# Patient Record
Sex: MALE | Race: WHITE | HISPANIC OR LATINO | URBAN - METROPOLITAN AREA
[De-identification: names, ages, dates, MRNs, and addresses within clinical notes are randomized per-mention and may not be internally consistent; named-entity substitution may affect disease eponyms.]

---
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
## 2025-05-13 ENCOUNTER — APPOINTMENT (OUTPATIENT)
Dept: RADIOLOGY | Facility: MEDICAL CENTER | Age: 20
DRG: 145 | End: 2025-05-13
Attending: STUDENT IN AN ORGANIZED HEALTH CARE EDUCATION/TRAINING PROGRAM

## 2025-05-13 ENCOUNTER — HOSPITAL ENCOUNTER (INPATIENT)
Facility: MEDICAL CENTER | Age: 20
LOS: 3 days | DRG: 145 | End: 2025-05-16
Attending: STUDENT IN AN ORGANIZED HEALTH CARE EDUCATION/TRAINING PROGRAM

## 2025-05-13 DIAGNOSIS — H49.01 RIGHT OCULOMOTOR NERVE PALSY: ICD-10-CM

## 2025-05-13 DIAGNOSIS — J34.1 MUCOCELE OF ETHMOID SINUS: Primary | ICD-10-CM

## 2025-05-13 DIAGNOSIS — H53.2 BINOCULAR VISION DISORDER WITH DIPLOPIA: ICD-10-CM

## 2025-05-13 LAB
ALBUMIN SERPL BCP-MCNC: 4.3 G/DL (ref 3.2–4.9)
ALBUMIN/GLOB SERPL: 1.3 G/DL
ALP SERPL-CCNC: 83 U/L (ref 30–99)
ALT SERPL-CCNC: 61 U/L (ref 2–50)
ANION GAP SERPL CALC-SCNC: 12 MMOL/L (ref 7–16)
APTT PPP: 27.9 SEC (ref 24.7–36)
AST SERPL-CCNC: 45 U/L (ref 12–45)
BASOPHILS # BLD AUTO: 0.7 % (ref 0–1.8)
BASOPHILS # BLD: 0.07 K/UL (ref 0–0.12)
BILIRUB SERPL-MCNC: 0.4 MG/DL (ref 0.1–1.5)
BUN SERPL-MCNC: 15 MG/DL (ref 8–22)
CALCIUM ALBUM COR SERPL-MCNC: 9 MG/DL (ref 8.5–10.5)
CALCIUM SERPL-MCNC: 9.2 MG/DL (ref 8.5–10.5)
CHLORIDE SERPL-SCNC: 105 MMOL/L (ref 96–112)
CO2 SERPL-SCNC: 23 MMOL/L (ref 20–33)
CREAT SERPL-MCNC: 0.78 MG/DL (ref 0.5–1.4)
EOSINOPHIL # BLD AUTO: 0.3 K/UL (ref 0–0.51)
EOSINOPHIL NFR BLD: 3 % (ref 0–6.9)
ERYTHROCYTE [DISTWIDTH] IN BLOOD BY AUTOMATED COUNT: 40.7 FL (ref 35.9–50)
GFR SERPLBLD CREATININE-BSD FMLA CKD-EPI: 130 ML/MIN/1.73 M 2
GLOBULIN SER CALC-MCNC: 3.4 G/DL (ref 1.9–3.5)
GLUCOSE SERPL-MCNC: 119 MG/DL (ref 65–99)
HCT VFR BLD AUTO: 47.5 % (ref 42–52)
HGB BLD-MCNC: 16 G/DL (ref 14–18)
IMM GRANULOCYTES # BLD AUTO: 0.06 K/UL (ref 0–0.11)
IMM GRANULOCYTES NFR BLD AUTO: 0.6 % (ref 0–0.9)
INR PPP: 0.97 (ref 0.87–1.13)
LYMPHOCYTES # BLD AUTO: 2.48 K/UL (ref 1–4.8)
LYMPHOCYTES NFR BLD: 24.8 % (ref 22–41)
MCH RBC QN AUTO: 27.4 PG (ref 27–33)
MCHC RBC AUTO-ENTMCNC: 33.7 G/DL (ref 32.3–36.5)
MCV RBC AUTO: 81.2 FL (ref 81.4–97.8)
MONOCYTES # BLD AUTO: 0.62 K/UL (ref 0–0.85)
MONOCYTES NFR BLD AUTO: 6.2 % (ref 0–13.4)
NEUTROPHILS # BLD AUTO: 6.49 K/UL (ref 1.82–7.42)
NEUTROPHILS NFR BLD: 64.7 % (ref 44–72)
NRBC # BLD AUTO: 0 K/UL
NRBC BLD-RTO: 0 /100 WBC (ref 0–0.2)
PLATELET # BLD AUTO: 274 K/UL (ref 164–446)
PMV BLD AUTO: 10.7 FL (ref 9–12.9)
POTASSIUM SERPL-SCNC: 3.7 MMOL/L (ref 3.6–5.5)
PROT SERPL-MCNC: 7.7 G/DL (ref 6–8.2)
PROTHROMBIN TIME: 12.9 SEC (ref 12–14.6)
RBC # BLD AUTO: 5.85 M/UL (ref 4.7–6.1)
SODIUM SERPL-SCNC: 140 MMOL/L (ref 135–145)
TSH SERPL DL<=0.005 MIU/L-ACNC: 3.11 UIU/ML (ref 0.38–5.33)
WBC # BLD AUTO: 10 K/UL (ref 4.8–10.8)

## 2025-05-13 PROCEDURE — 36415 COLL VENOUS BLD VENIPUNCTURE: CPT

## 2025-05-13 PROCEDURE — 99223 1ST HOSP IP/OBS HIGH 75: CPT | Mod: GC

## 2025-05-13 PROCEDURE — 70498 CT ANGIOGRAPHY NECK: CPT

## 2025-05-13 PROCEDURE — 770006 HCHG ROOM/CARE - MED/SURG/GYN SEMI*

## 2025-05-13 PROCEDURE — 99285 EMERGENCY DEPT VISIT HI MDM: CPT

## 2025-05-13 PROCEDURE — 70496 CT ANGIOGRAPHY HEAD: CPT

## 2025-05-13 PROCEDURE — 84443 ASSAY THYROID STIM HORMONE: CPT

## 2025-05-13 PROCEDURE — 80053 COMPREHEN METABOLIC PANEL: CPT

## 2025-05-13 PROCEDURE — 85025 COMPLETE CBC W/AUTO DIFF WBC: CPT

## 2025-05-13 PROCEDURE — 85610 PROTHROMBIN TIME: CPT

## 2025-05-13 PROCEDURE — 700117 HCHG RX CONTRAST REV CODE 255: Performed by: STUDENT IN AN ORGANIZED HEALTH CARE EDUCATION/TRAINING PROGRAM

## 2025-05-13 PROCEDURE — 93005 ELECTROCARDIOGRAM TRACING: CPT | Mod: TC

## 2025-05-13 PROCEDURE — 85730 THROMBOPLASTIN TIME PARTIAL: CPT

## 2025-05-13 RX ORDER — AMOXICILLIN 250 MG
2 CAPSULE ORAL EVERY EVENING
Status: DISCONTINUED | OUTPATIENT
Start: 2025-05-13 | End: 2025-05-16 | Stop reason: HOSPADM

## 2025-05-13 RX ORDER — POLYETHYLENE GLYCOL 3350 17 G/17G
1 POWDER, FOR SOLUTION ORAL
Status: DISCONTINUED | OUTPATIENT
Start: 2025-05-13 | End: 2025-05-16 | Stop reason: HOSPADM

## 2025-05-13 RX ORDER — ACETAMINOPHEN 325 MG/1
650 TABLET ORAL EVERY 6 HOURS PRN
Status: DISCONTINUED | OUTPATIENT
Start: 2025-05-13 | End: 2025-05-16 | Stop reason: HOSPADM

## 2025-05-13 RX ADMIN — IOHEXOL 70 ML: 350 INJECTION, SOLUTION INTRAVENOUS at 21:30

## 2025-05-13 ASSESSMENT — ENCOUNTER SYMPTOMS
BLURRED VISION: 0
HEADACHES: 1
ABDOMINAL PAIN: 0
FEVER: 0
DOUBLE VISION: 1
EYE PAIN: 1
SHORTNESS OF BREATH: 0

## 2025-05-14 ENCOUNTER — APPOINTMENT (OUTPATIENT)
Dept: RADIOLOGY | Facility: MEDICAL CENTER | Age: 20
DRG: 145 | End: 2025-05-14
Attending: OTOLARYNGOLOGY

## 2025-05-14 ENCOUNTER — APPOINTMENT (OUTPATIENT)
Dept: RADIOLOGY | Facility: MEDICAL CENTER | Age: 20
DRG: 145 | End: 2025-05-14
Attending: STUDENT IN AN ORGANIZED HEALTH CARE EDUCATION/TRAINING PROGRAM

## 2025-05-14 PROBLEM — J34.1 MUCOCELE OF ETHMOID SINUS: Status: ACTIVE | Noted: 2025-05-14

## 2025-05-14 PROCEDURE — 70543 MRI ORBT/FAC/NCK W/O &W/DYE: CPT

## 2025-05-14 PROCEDURE — 70553 MRI BRAIN STEM W/O & W/DYE: CPT

## 2025-05-14 PROCEDURE — A9579 GAD-BASE MR CONTRAST NOS,1ML: HCPCS | Mod: JZ | Performed by: STUDENT IN AN ORGANIZED HEALTH CARE EDUCATION/TRAINING PROGRAM

## 2025-05-14 PROCEDURE — 99233 SBSQ HOSP IP/OBS HIGH 50: CPT | Performed by: STUDENT IN AN ORGANIZED HEALTH CARE EDUCATION/TRAINING PROGRAM

## 2025-05-14 PROCEDURE — 770006 HCHG ROOM/CARE - MED/SURG/GYN SEMI*

## 2025-05-14 PROCEDURE — 700117 HCHG RX CONTRAST REV CODE 255: Mod: JZ | Performed by: STUDENT IN AN ORGANIZED HEALTH CARE EDUCATION/TRAINING PROGRAM

## 2025-05-14 RX ADMIN — GADOTERIDOL 20 ML: 279.3 INJECTION, SOLUTION INTRAVENOUS at 08:31

## 2025-05-14 SDOH — ECONOMIC STABILITY: TRANSPORTATION INSECURITY
IN THE PAST 12 MONTHS, HAS THE LACK OF TRANSPORTATION KEPT YOU FROM MEDICAL APPOINTMENTS OR FROM GETTING MEDICATIONS?: NO

## 2025-05-14 SDOH — ECONOMIC STABILITY: TRANSPORTATION INSECURITY
IN THE PAST 12 MONTHS, HAS LACK OF RELIABLE TRANSPORTATION KEPT YOU FROM MEDICAL APPOINTMENTS, MEETINGS, WORK OR FROM GETTING THINGS NEEDED FOR DAILY LIVING?: NO

## 2025-05-14 ASSESSMENT — ENCOUNTER SYMPTOMS
VOMITING: 0
NAUSEA: 0
SHORTNESS OF BREATH: 0
FEVER: 0
ABDOMINAL PAIN: 0
BLURRED VISION: 1

## 2025-05-14 ASSESSMENT — COGNITIVE AND FUNCTIONAL STATUS - GENERAL
DAILY ACTIVITIY SCORE: 24
SUGGESTED CMS G CODE MODIFIER MOBILITY: CH
MOBILITY SCORE: 24
SUGGESTED CMS G CODE MODIFIER DAILY ACTIVITY: CH

## 2025-05-14 ASSESSMENT — SOCIAL DETERMINANTS OF HEALTH (SDOH)
WITHIN THE LAST YEAR, HAVE TO BEEN RAPED OR FORCED TO HAVE ANY KIND OF SEXUAL ACTIVITY BY YOUR PARTNER OR EX-PARTNER?: NO
WITHIN THE PAST 12 MONTHS, YOU WORRIED THAT YOUR FOOD WOULD RUN OUT BEFORE YOU GOT THE MONEY TO BUY MORE: NEVER TRUE
WITHIN THE LAST YEAR, HAVE YOU BEEN HUMILIATED OR EMOTIONALLY ABUSED IN OTHER WAYS BY YOUR PARTNER OR EX-PARTNER?: NO
WITHIN THE PAST 12 MONTHS, THE FOOD YOU BOUGHT JUST DIDN'T LAST AND YOU DIDN'T HAVE MONEY TO GET MORE: NEVER TRUE
WITHIN THE LAST YEAR, HAVE YOU BEEN AFRAID OF YOUR PARTNER OR EX-PARTNER?: NO
WITHIN THE LAST YEAR, HAVE YOU BEEN KICKED, HIT, SLAPPED, OR OTHERWISE PHYSICALLY HURT BY YOUR PARTNER OR EX-PARTNER?: NO
IN THE PAST 12 MONTHS, HAS THE ELECTRIC, GAS, OIL, OR WATER COMPANY THREATENED TO SHUT OFF SERVICE IN YOUR HOME?: NO
WITHIN THE LAST YEAR, HAVE YOU BEEN AFRAID OF YOUR PARTNER OR EX-PARTNER?: NO

## 2025-05-14 ASSESSMENT — PAIN DESCRIPTION - PAIN TYPE
TYPE: ACUTE PAIN

## 2025-05-14 ASSESSMENT — PATIENT HEALTH QUESTIONNAIRE - PHQ9
2. FEELING DOWN, DEPRESSED, IRRITABLE, OR HOPELESS: NOT AT ALL
2. FEELING DOWN, DEPRESSED, IRRITABLE, OR HOPELESS: NOT AT ALL
1. LITTLE INTEREST OR PLEASURE IN DOING THINGS: NOT AT ALL
1. LITTLE INTEREST OR PLEASURE IN DOING THINGS: NOT AT ALL
SUM OF ALL RESPONSES TO PHQ9 QUESTIONS 1 AND 2: 0
SUM OF ALL RESPONSES TO PHQ9 QUESTIONS 1 AND 2: 0

## 2025-05-14 NOTE — H&P
[facility] Internal Medicine History & Physical Note    Date of Service  5/13/2025    [facility] Team: DAYANA   Attending: Bradly Devine D.o.  Senior Resident: Dr. Joyner  Contact Number: [phone]    Primary Care Physician  RADHA Tong    Code Status  Full Code    Chief Complaint  Chief Complaint   Patient presents with    Blurred Vision     Pt states for a few days he's been having issues seeing out of his right eye, feels like it is cross eyed at times. Denies any trauma. 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        History of Presenting Illness (HPI):   Gurjit Burgess is a 19 y.o. male with no significant past medical history who presented 5/13/2025 with double vision.  Patient states that all of a sudden a couple of days ago he noticed that it was hard for him to focus.  He felt like there was maybe dirt or an eyelash in his right eye.  When he went to go look at a mirror, he noticed that it seemed that his right eye was not aligned and more droopy than the left.  He reports associated eye pain and pain along the right side of his nose near his eye that comes and goes.  He denies any blurry vision.  He denies trauma to the area.    Vitals are stable.  CBC and CMP fairly unremarkable.  CTA head/neck without any acute abnormalities.  Stroke neurology was consulted in the ED who recommended further evaluation with MRI and formal neurology consult in the morning.     I discussed the plan of care with patient and family.    Review of Systems  Review of Systems   Constitutional:  Negative for fever.   Eyes:  Positive for double vision and pain. Negative for blurred vision.   Respiratory:  Negative for shortness of breath.    Cardiovascular:  Negative for chest pain.   Gastrointestinal:  Negative for abdominal pain.   Neurological:  Positive for headaches.       Past Medical History   has a past medical history of Asthma, Delayed emergence from general anesthesia, and Pain.    Surgical History   has a past  surgical history that includes other; orif, elbow (Right, 2008); pr knee scope,diagnostic (Right, 8/13/2020); and ligament repair (Right, 8/13/2020).     Family History  family history is not on file.   Family history reviewed with patient.     Social History  Denies tobacco, alcohol, recreational drug use    Allergies  Allergies   Allergen Reactions    Amoxicillin      Generalized swelling       Medications  Prior to Admission Medications   Prescriptions Last Dose Informant Patient Reported? Taking?   NS SOLN 60 mL with albuterol 2.5 mg/0.5 mL NEBU 100 mg   Yes No   Sig: by Nebulization route as needed. Very rarely   acetaminophen (TYLENOL) 325 MG Tab   Yes No   Sig: Take 650 mg by mouth every four hours as needed.   cyclobenzaprine (FLEXERIL) 5 MG tablet   No No   Sig: Take 1-2 Tabs by mouth 3 times a day as needed for Moderate Pain or Muscle Spasms.      Facility-Administered Medications: None       Physical Exam  Temp:  [36 °C (96.8 °F)] 36 °C (96.8 °F)  Pulse:  [88-90] 88  Resp:  [16] 16  BP: (120-160)/(67-96) 133/67  SpO2:  [97 %-98 %] 98 %  Blood Pressure: 133/67   Temperature: 36 °C (96.8 °F)   Pulse: 88   Respiration: 16   Pulse Oximetry: 98 %       Physical Exam  Constitutional:       General: He is not in acute distress.  HENT:      Head: Normocephalic and atraumatic.   Eyes:      General:         Right eye: No discharge.         Left eye: No discharge.      Extraocular Movements: Extraocular movements intact.      Conjunctiva/sclera: Conjunctivae normal.      Pupils: Pupils are equal, round, and reactive to light.   Cardiovascular:      Heart sounds: Normal heart sounds.   Pulmonary:      Breath sounds: Normal breath sounds.   Neurological:      General: No focal deficit present.      Mental Status: He is oriented to person, place, and time.      Cranial Nerves: No cranial nerve deficit.      Sensory: No sensory deficit.      Motor: No weakness.         Laboratory:  Recent Labs     05/13/25 2044   WBC  "10.0   RBC 5.85   HEMOGLOBIN 16.0   HEMATOCRIT 47.5   MCV 81.2*   MCH 27.4   MCHC 33.7   RDW 40.7   PLATELETCT 274   MPV 10.7     Recent Labs     05/13/25 2044   SODIUM 140   POTASSIUM 3.7   CHLORIDE 105   CO2 23   GLUCOSE 119*   BUN 15   CREATININE 0.78   CALCIUM 9.2     Recent Labs     05/13/25 2044   ALTSGPT 61*   ASTSGOT 45   ALKPHOSPHAT 83   TBILIRUBIN 0.4   GLUCOSE 119*     Recent Labs     05/13/25 2044   APTT 27.9   INR 0.97     No results for input(s): \"NTPROBNP\" in the last 72 hours.      No results for input(s): \"TROPONINT\" in the last 72 hours.    Imaging:  CT-CTA NECK WITH & W/O-POST PROCESSING   Final Result      1. No evidence of flow-limiting stenosis in the cervical carotid or cervical vertebral arteries.      CT-CTA HEAD WITH & W/O-POST PROCESS   Final Result         1. No hemodynamically significant narrowing of the major intracranial vessels.      MR-BRAIN-WITH & W/O    (Results Pending)       Assessment/Plan:  Problem Representation:   This is a 19-year-old male with no significant past medical history who is presenting with diplopia of his right eye concerning for a cranial nerve palsy, thus requiring admission for monitoring and further evaluation with MRI and formal neurology consult.  I anticipate this patient will require at least two midnights for appropriate medical management, necessitating inpatient admission because diplopia    Patient will need a Med/Surg bed on NEUROLOGY service .  The need is secondary to diplopia.    * Diplopia- (present on admission)  Assessment & Plan  Onset a couple of days ago. States that he is having a difficult time focusing.  No known trauma to the eye. There are no focal neurological deficits noted on exam.  Pupils are equal and reactive to light and extraocular muscles are intact  CTA head/neck negative for any acute abnormalities  Stroke neurology (Dr. Pearl) was contacted in the ED who recommended MRI brain with and without  -Follow-up on MRI " brain  - Formal neurology consult in the morning  -Every 4 hour neurochecks        VTE prophylaxis: SCDs/TEDs

## 2025-05-14 NOTE — CARE PLAN
The patient is Stable - Low risk of patient condition declining or worsening    Shift Goals  Clinical Goals: Q4 neuro checks, patient safety  Patient Goals: rest  Family Goals: cindy    Patient A & O x 4, utilizes call light appropriately. Admission info obtained during downtime, added after EPIC reset.  Bed in low and locked position, call light in reach, rounded on hourly.       Progress made toward(s) clinical / shift goals:    Problem: Knowledge Deficit - Standard  Goal: Patient and family/care givers will demonstrate understanding of plan of care, disease process/condition, diagnostic tests and medications  5/14/2025 0436 by Alban Abel R.N.  Outcome: Progressing      Patient is not progressing towards the following goals:

## 2025-05-14 NOTE — ED NOTES
Med Rec complete per patient  Allergies reviewed with patient  Patient denies taking any RX or OTC meds in the last 30 days    Marley Aburto CPhT

## 2025-05-14 NOTE — PROGRESS NOTES
4 Eyes Skin Assessment Completed by DIANA Phoenix and DIANA Park.    Head WDL  Ears Redness and Blanching tops of ears where glasses sit.   Nose WDL  Mouth WDL  Neck WDL  Breast/Chest WDL  Shoulder Blades WDL  Spine WDL  (R) Arm/Elbow/Hand Scar at elbow  (L) Arm/Elbow/Hand WDL  Abdomen Scar  Groin WDL  Scrotum/Coccyx/Buttocks WDL  (R) Leg Scar  (L) Leg WDL  (R) Heel/Foot/Toe WDL  (L) Heel/Foot/Toe WDL          Devices In Places PIV      Interventions In Place Pillows    Possible Skin Injury No    Pictures Uploaded Into Epic N/A  Wound Consult Placed N/A  RN Wound Prevention Protocol Ordered No

## 2025-05-14 NOTE — ED NOTES
Transport at bedside for pt transfer. Pt is A&Ox4 on RA. Chart sent with pt transfer, all belongings accounted for

## 2025-05-14 NOTE — HOSPITAL COURSE
Gurjit Burgess is a 19-year-old male with unremarkable PMHx.  Admitted 5/13 for double vision.    Per history:  Patient states that all of a sudden a couple of days ago he noticed that it was hard for him to focus. He felt like there was maybe dirt or an eyelash in his right eye. When he went to go look at a mirror, he noticed that it seemed that his right eye was not aligned and more droopy than the left. He reports associated eye pain and pain along the right side of his nose near his eye that comes and goes. He denies any blurry vision. He denies trauma to the area.     In the ED: Vital stable.  CTA head and neck without acute processes.  Neurology was consulted and recommended MRI and formal neurology consult.

## 2025-05-14 NOTE — ED TRIAGE NOTES
Gurjit Burgess  19 y.o. male    Chief Complaint   Patient presents with    Blurred Vision     Pt states for a few days he's been having issues seeing out of his right eye, feels like it is cross eyed at times. Denies any trauma. 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      Pt ambulated to triage for above complaint. Pt states no pain in eye unless trying to focus hard on something. Pt alert and oriented.     Vitals:    05/13/25 1947   BP: (!) 160/96   Pulse: 90   Resp: 16   Temp: 36 °C (96.8 °F)   SpO2: 97%       Triage process explained to patient, apologized for wait time, and returned to [facility].  Pt informed to notify staff of any change in condition.

## 2025-05-14 NOTE — ED PROVIDER NOTES
ED Provider Note    CHIEF COMPLAINT  Chief Complaint   Patient presents with    Blurred Vision     Pt states for a few days he's been having issues seeing out of his right eye, feels like it is cross eyed at times. Denies any trauma. 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        EXTERNAL RECORDS REVIEWED  Reviewed several internal emergency department notes and operative report by orthopedic surgeon back in 2020 for patellofemoral instability    HPI/ROS  LIMITATION TO HISTORY   Select: : None  OUTSIDE HISTORIAN(S):  Mother at bedside providing collateral history    Gurjit Burgess is a 19 y.o. male with past medical history of asthma presenting to the emergency department for diplopia.  States that over the last 3 days, he has been having double vision, he endorses associated headache.  He denies any change in speech, swallow, unilateral weakness numbness tingling, clumsiness.  No recent trauma.  He does wear glasses.  He says that his visual acuity in his eyes is unchanged when he closes 1 eye.  When he closes 1 eye he does not have any diplopia.      PAST MEDICAL HISTORY   has a past medical history of Asthma, Delayed emergence from general anesthesia, and Pain.    SURGICAL HISTORY   has a past surgical history that includes other; orif, elbow (Right, 2008); knee scope,diagnostic (Right, 8/13/2020); and ligament repair (Right, 8/13/2020).    FAMILY HISTORY  History reviewed. No pertinent family history.    SOCIAL HISTORY  Social History     Tobacco Use    Smoking status: Never    Smokeless tobacco: Never   Vaping Use    Vaping status: Never Used   Substance and Sexual Activity    Alcohol use: No    Drug use: No    Sexual activity: Not on file       CURRENT MEDICATIONS  Home Medications       Reviewed by Maryjo Cummins (Pharmacy Tech) on 05/13/25 at 2301  Med List Status: Complete     Medication Last Dose Status        Patient Cheikh Taking any Medications                         Audit from Redirected  "Encounters    **Home medications have not yet been reviewed for this encounter**         ALLERGIES  Allergies   Allergen Reactions    Amoxicillin      Generalized swelling       PHYSICAL EXAM  VITAL SIGNS: /67   Pulse 68   Temp 36 °C (96.8 °F) (Temporal)   Resp 18   Ht 1.803 m (5' 11\")   Wt (!) 129 kg (283 lb 15.2 oz)   SpO2 96%   BMI 39.60 kg/m²    General: non-toxic, no acute distress  Neuro:   - Alert and oriented  - Patient has strabismus with unequal extraocular motion, is unable to elevate his right eye with extraocular motion testing Otherwise CN 2-12 grossly intact  - Pupils are reactive bilaterally, he does not have anisocoria  - No vertical or rotatory nystagmus.  Mild horizontal nystagmus  - Upper extremities         - Normal strength         - Sensation intact to light touch  - Lower extremities         - Normal strength         - Sensation intact to light touch  - No pronator drift  - Romberg negative  - Normal finger to nose  - No truncal ataxia  HEENT:   - Head: Normocephalic, atraumatic  - Eyes: PERRL  - Ears/Nose: normal external nose and ears  - Mouth: moist mucosal membranes  Resp: clear to auscultation, no increased work of breathing  CV: Regular rate and rhythm  Abd: Soft, non-tender, non-distended  Extremities: No peripheral edema  Psych: lucid and conversational, pleasant cooperative        DIAGNOSTIC STUDIES / PROCEDURES    LABS and ECG  Results for orders placed or performed during the hospital encounter of 05/13/25   CBC WITH DIFFERENTIAL    Collection Time: 05/13/25  8:44 PM   Result Value Ref Range    WBC 10.0 4.8 - 10.8 K/uL    RBC 5.85 4.70 - 6.10 M/uL    Hemoglobin 16.0 14.0 - 18.0 g/dL    Hematocrit 47.5 42.0 - 52.0 %    MCV 81.2 (L) 81.4 - 97.8 fL    MCH 27.4 27.0 - 33.0 pg    MCHC 33.7 32.3 - 36.5 g/dL    RDW 40.7 35.9 - 50.0 fL    Platelet Count 274 164 - 446 K/uL    MPV 10.7 9.0 - 12.9 fL    Neutrophils-Polys 64.70 44.00 - 72.00 %    Lymphocytes 24.80 22.00 - 41.00 % "    Monocytes 6.20 0.00 - 13.40 %    Eosinophils 3.00 0.00 - 6.90 %    Basophils 0.70 0.00 - 1.80 %    Immature Granulocytes 0.60 0.00 - 0.90 %    Nucleated RBC 0.00 0.00 - 0.20 /100 WBC    Neutrophils (Absolute) 6.49 1.82 - 7.42 K/uL    Lymphs (Absolute) 2.48 1.00 - 4.80 K/uL    Monos (Absolute) 0.62 0.00 - 0.85 K/uL    Eos (Absolute) 0.30 0.00 - 0.51 K/uL    Baso (Absolute) 0.07 0.00 - 0.12 K/uL    Immature Granulocytes (abs) 0.06 0.00 - 0.11 K/uL    NRBC (Absolute) 0.00 K/uL   COMP METABOLIC PANEL    Collection Time: 25  8:44 PM   Result Value Ref Range    Sodium 140 135 - 145 mmol/L    Potassium 3.7 3.6 - 5.5 mmol/L    Chloride 105 96 - 112 mmol/L    Co2 23 20 - 33 mmol/L    Anion Gap 12.0 7.0 - 16.0    Glucose 119 (H) 65 - 99 mg/dL    Bun 15 8 - 22 mg/dL    Creatinine 0.78 0.50 - 1.40 mg/dL    Calcium 9.2 8.5 - 10.5 mg/dL    Correct Calcium 9.0 8.5 - 10.5 mg/dL    AST(SGOT) 45 12 - 45 U/L    ALT(SGPT) 61 (H) 2 - 50 U/L    Alkaline Phosphatase 83 30 - 99 U/L    Total Bilirubin 0.4 0.1 - 1.5 mg/dL    Albumin 4.3 3.2 - 4.9 g/dL    Total Protein 7.7 6.0 - 8.2 g/dL    Globulin 3.4 1.9 - 3.5 g/dL    A-G Ratio 1.3 g/dL   Prothrombin Time    Collection Time: 25  8:44 PM   Result Value Ref Range    PT 12.9 12.0 - 14.6 sec    INR 0.97 0.87 - 1.13   APTT    Collection Time: 25  8:44 PM   Result Value Ref Range    APTT 27.9 24.7 - 36.0 sec   ESTIMATED GFR    Collection Time: 25  8:44 PM   Result Value Ref Range    GFR (CKD-EPI) 130 >60 mL/min/1.73 m 2   EKG    Collection Time: 25 11:05 PM   Result Value Ref Range    Report       [facility] Emergency Dept.    Test Date:  2025  Pt Name:    MAKENZIE WISEMAN                Department: ER  MRN:        [id number]                      Room:       [facility]  Gender:     Male                         Technician: [id number]  :        2005                   Requested By:ZAHRAA EUBANKS  Order #:    [id number]                    Reading  MD:    Measurements  Intervals                                Axis  Rate:       65                           P:          34  IN:         152                          QRS:        -48  QRSD:       107                          T:          -15  QT:         393  QTc:        409    Interpretive Statements  Sinus rhythm  LAD, consider left anterior fascicular block  Borderline T abnormalities, inferior leads  Compared to ECG 08/22/2017 22:19:52  T-wave abnormality now present  Sinus bradycardia no longer present  Left-axis deviation no longer present         RADIOLOGY  I have independently interpreted the diagnostic imaging associated with this visit and am waiting the final reading from the radiologist.   My preliminary interpretation is as follows:   - Reviewed CT angiogram head and neck shows no intracranial process  Radiologist interpretation:   CT-CTA NECK WITH & W/O-POST PROCESSING   Final Result      1. No evidence of flow-limiting stenosis in the cervical carotid or cervical vertebral arteries.      CT-CTA HEAD WITH & W/O-POST PROCESS   Final Result         1. No hemodynamically significant narrowing of the major intracranial vessels.      MR-BRAIN-WITH & W/O    (Results Pending)           MEDICAL DECISION MAKING      ED COURSE AND PLAN    This is a very pleasant 19-year-old gentleman who comes to the emergency department for 3 days of binocular diplopia.  His visual acuity is intact.  On extraocular motion testing he has evidence of superior rectus/cranial nerve III palsy.  Concern for aneurysm or intracranial mass so I ordered CT angiogram head and neck as well as MRI brain with and without contrast.   As he has no visual deficits and his visual acuity is at his baseline, I do not feel that pressure testing of the eye, slit-lamp, retinal exam, discussion with ophthalmology is indicated at this time    CT angiogram head and neck shows no intracranial vascular abnormality or intracranial hemorrhage.    I discussed  case with our stroke neurologist on-call Dr. Pearl as our general neurologist is no longer on-call, he recommended an MRI with and without contrast of the brain, given age frame could be a inflammatory lesion, demyelinating lesion or an intracranial mass.  Patient can be seen by neurology in the morning    Discussed case with Dr. Devine for admission pending Mri brain               Procedures:      ----------------------------------------------------------------------------------  DISCUSSIONS    I have discussed management of the patient with the following physicians and KINGA's: Stroke neurology, admitting hospitalist    Discussion of management with other [facility] or appropriate source(s):     Escalation of care considered, and ultimately not performed: Consider ophthalmology consultation     Barriers to care at this time, including but not limited to:     Decision tools and prescription drugs considered including, but not limited to:     FINAL IMPRESSION    1. Right oculomotor nerve palsy    2. Binocular vision disorder with diplopia        New Prescriptions    No medications on file       No follow-up provider specified.      DISPOSITION      Admission: The patient will be admitted for further evaluation and treatment. Discussed case with consultants and relayed all necessary information.      This chart was dictated using an electronic voice recognition software. The chart has been reviewed and edited but there is still possibility for dictation errors due to limitation of software.    Michael Lyle,  5/13/2025

## 2025-05-14 NOTE — PROGRESS NOTES
Diplopia.  [facility] Medicine Daily Progress Note    Date of Service  5/14/2025    Chief Complaint  Gurjit Burgess is a 19 y.o. male admitted 5/13/2025 with double vision    Hospital Course  Gurjit Burgess is a 19-year-old male with unremarkable PMHx.  Admitted 5/13 for double vision.    Per history:  Patient states that all of a sudden a couple of days ago he noticed that it was hard for him to focus. He felt like there was maybe dirt or an eyelash in his right eye. When he went to go look at a mirror, he noticed that it seemed that his right eye was not aligned and more droopy than the left. He reports associated eye pain and pain along the right side of his nose near his eye that comes and goes. He denies any blurry vision. He denies trauma to the area.     In the ED: Vital stable.  CTA head and neck without acute processes.  Neurology was consulted and recommended MRI and formal neurology consult.    Interval Problem Update  5/14: Vitals overnight stable.   through 133.  MRI brain and orbits completed today.  Showing a 48 x 32 x 42 mm frontal ethmoidal mucocele.  Discussed with neurosurgery, Dr. Will.  Recommending co-management with ENT.  Discussed with Dr. Harding.     I have discussed this patient's plan of care and discharge plan at IDT rounds today with Case Management, Nursing, Nursing leadership, and other members of the IDT team.    Consultants/Specialty  ENT and neurosurgery    Code Status  Full Code    Disposition  The patient is not medically cleared for discharge to home or a post-acute facility.      I have placed the appropriate orders for post-discharge needs.    Review of Systems  Review of Systems   Constitutional:  Negative for fever and malaise/fatigue.   Eyes:  Positive for blurred vision.   Respiratory:  Negative for shortness of breath.    Cardiovascular:  Negative for chest pain and leg swelling.   Gastrointestinal:  Negative for abdominal pain, nausea and vomiting.         Physical Exam  Temp:  [36 °C (96.8 °F)-36.8 °C (98.2 °F)] 36.8 °C (98.2 °F)  Pulse:  [55-90] 55  Resp:  [16-20] 18  BP: (115-160)/(61-96) 125/61  SpO2:  [94 %-98 %] 95 %    Physical Exam  Vitals and nursing note reviewed.   Constitutional:       General: He is not in acute distress.     Appearance: Normal appearance. He is not ill-appearing.   Eyes:      General:         Right eye: No discharge.         Left eye: No discharge.      Extraocular Movements: Extraocular movements intact.      Conjunctiva/sclera: Conjunctivae normal.      Pupils: Pupils are equal, round, and reactive to light.      Comments: Mild right proptosis is present   Cardiovascular:      Rate and Rhythm: Normal rate and regular rhythm.   Pulmonary:      Effort: Pulmonary effort is normal.      Breath sounds: Normal breath sounds.   Skin:     General: Skin is warm and dry.   Neurological:      Mental Status: He is alert and oriented to person, place, and time. Mental status is at baseline.   Psychiatric:         Mood and Affect: Mood normal.         Behavior: Behavior normal.         Fluids    Intake/Output Summary (Last 24 hours) at 5/14/2025 1340  Last data filed at 5/14/2025 0930  Gross per 24 hour   Intake 480 ml   Output --   Net 480 ml        Laboratory  Recent Labs     05/13/25 2044   WBC 10.0   RBC 5.85   HEMOGLOBIN 16.0   HEMATOCRIT 47.5   MCV 81.2*   MCH 27.4   MCHC 33.7   RDW 40.7   PLATELETCT 274   MPV 10.7     Recent Labs     05/13/25 2044   SODIUM 140   POTASSIUM 3.7   CHLORIDE 105   CO2 23   GLUCOSE 119*   BUN 15   CREATININE 0.78   CALCIUM 9.2     Recent Labs     05/13/25 2044   APTT 27.9   INR 0.97               Imaging  MR-BRAIN-WITH & W/O   Final Result      1.  There is an approximately 48 x 32 x 42 mm sized lobulated T2 hyperintense and T1 hypointense lesion noted involving right ethmoid and frontal sinuses with erosion of medial wall of the right orbit and posterior wall of the right frontal sinus. The    lesion  extends into the right medial orbital extraconal space and causing mass effect upon the medial rectus muscle. Right-sided proptosis is seen. Posteriorly, the lesion extends into the extradural space in the right frontal region and causing mass    effect upon the right frontal lobe. There is no enhancement. This finding likely represent a frontoethmoidal mucocele. There is mild dural enhancement noted in the right frontal region likely secondary to the inflammation.   2.  Fluid/mucosal thickening in the left frontal, left ethmoid and right maxillary sinuses.      MR-ORBITS WITH AND WITHOUT SEQUENCES   Final Result      1.  There is an approximately 48 x 32 x 42 mm sized lobulated T2 hyperintense and T1 hypointense lesion noted involving right ethmoid and frontal sinuses with erosion of medial wall of the right orbit and posterior wall of the right frontal sinus. The    lesion extends into the right medial orbital extraconal space and causing mass effect upon the medial rectus muscle. Right-sided proptosis is seen. Posteriorly, the lesion extends into the extradural space in the right frontal region and causing mass    effect upon the right frontal lobe. There is no enhancement. This finding likely represent a frontoethmoidal mucocele. There is mild dural enhancement noted in the right frontal region likely secondary to the inflammation.   2.  Fluid/mucosal thickening in the left frontal, left ethmoid and right maxillary sinuses.      CT-CTA NECK WITH & W/O-POST PROCESSING   Final Result      1. No evidence of flow-limiting stenosis in the cervical carotid or cervical vertebral arteries.      CT-CTA HEAD WITH & W/O-POST PROCESS   Final Result         1. No hemodynamically significant narrowing of the major intracranial vessels.           Assessment/Plan  * Mucocele of ethmoid sinus  Assessment & Plan  MRI brain and orbits:  There is an approximately 48 x 32 x 42 mm sized lobulated T2 hyperintense and T1 hypointense  lesion noted involving right ethmoid and frontal sinuses with erosion of medial wall of the right orbit and posterior wall of the right frontal sinus. Likely an frontoethmoidal mucocele.  - Neurosurgery consult placed; discussed with Dr. Will  - ENT consult placed, discussed with Dr. Harding  - Will likely need comanagement between both surgeons for removal and repair of sinus defect  - Will defer surgical planning/timeline to surgeons that are following    Diplopia- (present on admission)  Assessment & Plan  Secondary to frontoethmoidal mucocele       My total time spent caring for the patient on the day of the encounter was 59 minutes.   This does not include time spent on separately billable procedures/tests.      VTE prophylaxis:   SCDs/TEDs      I have performed a physical exam and reviewed and updated ROS and Plan today (5/14/2025). In review of yesterday's note (5/13/2025), there are no changes except as documented above.

## 2025-05-14 NOTE — ASSESSMENT & PLAN NOTE
MRI brain and orbits:  There is an approximately 48 x 32 x 42 mm sized lobulated T2 hyperintense and T1 hypointense lesion noted involving right ethmoid and frontal sinuses with erosion of medial wall of the right orbit and posterior wall of the right frontal sinus. Likely an frontoethmoidal mucocele.  - ENT consulted, to OR 5/15/25 for marsupialization of ethmoid mucocele  - Neurosurgery consulted for assistance with craniotomy / cranioplasty if needed

## 2025-05-14 NOTE — ED NOTES
Visual acuity screening performed.     Both eyes: 20/30 -2  Left eye: 20/25 -2  Right eye: 20/40 -1    Patient wears corrective lenses.

## 2025-05-14 NOTE — PROGRESS NOTES
Patient to floor.  All belonging accounted for. Patient able to ambulate without issue. Patient instructed on use of call light. A & O x4, neuro check WDL aside from right side disconjugation.

## 2025-05-15 ENCOUNTER — ANESTHESIA EVENT (OUTPATIENT)
Dept: SURGERY | Facility: MEDICAL CENTER | Age: 20
DRG: 145 | End: 2025-05-15

## 2025-05-15 ENCOUNTER — SURGERY (OUTPATIENT)
Age: 20
End: 2025-05-15
Payer: OTHER MISCELLANEOUS

## 2025-05-15 ENCOUNTER — ANESTHESIA (OUTPATIENT)
Dept: SURGERY | Facility: MEDICAL CENTER | Age: 20
DRG: 145 | End: 2025-05-15

## 2025-05-15 ENCOUNTER — APPOINTMENT (OUTPATIENT)
Dept: RADIOLOGY | Facility: MEDICAL CENTER | Age: 20
DRG: 145 | End: 2025-05-15
Attending: OTOLARYNGOLOGY

## 2025-05-15 PROBLEM — J45.909 ASTHMA: Status: ACTIVE | Noted: 2025-05-15

## 2025-05-15 PROCEDURE — 160002 HCHG RECOVERY MINUTES (STAT): Performed by: OTOLARYNGOLOGY

## 2025-05-15 PROCEDURE — 770006 HCHG ROOM/CARE - MED/SURG/GYN SEMI*

## 2025-05-15 PROCEDURE — 160042 HCHG SURGERY MINUTES - EA ADDL 1 MIN LEVEL 5: Performed by: OTOLARYNGOLOGY

## 2025-05-15 PROCEDURE — 700105 HCHG RX REV CODE 258: Performed by: STUDENT IN AN ORGANIZED HEALTH CARE EDUCATION/TRAINING PROGRAM

## 2025-05-15 PROCEDURE — 09SM0ZZ REPOSITION NASAL SEPTUM, OPEN APPROACH: ICD-10-PCS | Performed by: OTOLARYNGOLOGY

## 2025-05-15 PROCEDURE — 70450 CT HEAD/BRAIN W/O DYE: CPT

## 2025-05-15 PROCEDURE — 160048 HCHG OR STATISTICAL LEVEL 1-5: Performed by: OTOLARYNGOLOGY

## 2025-05-15 PROCEDURE — 09BT0ZZ EXCISION OF LEFT FRONTAL SINUS, OPEN APPROACH: ICD-10-PCS | Performed by: OTOLARYNGOLOGY

## 2025-05-15 PROCEDURE — 700111 HCHG RX REV CODE 636 W/ 250 OVERRIDE (IP): Mod: JZ | Performed by: STUDENT IN AN ORGANIZED HEALTH CARE EDUCATION/TRAINING PROGRAM

## 2025-05-15 PROCEDURE — 160015 HCHG STAT PREOP MINUTES: Performed by: OTOLARYNGOLOGY

## 2025-05-15 PROCEDURE — 160035 HCHG PACU - 1ST 60 MINS PHASE I: Performed by: OTOLARYNGOLOGY

## 2025-05-15 PROCEDURE — 700102 HCHG RX REV CODE 250 W/ 637 OVERRIDE(OP)

## 2025-05-15 PROCEDURE — 87076 CULTURE ANAEROBE IDENT EACH: CPT | Mod: 91

## 2025-05-15 PROCEDURE — 700111 HCHG RX REV CODE 636 W/ 250 OVERRIDE (IP)

## 2025-05-15 PROCEDURE — 099R8ZZ DRAINAGE OF LEFT MAXILLARY SINUS, VIA NATURAL OR ARTIFICIAL OPENING ENDOSCOPIC: ICD-10-PCS | Performed by: OTOLARYNGOLOGY

## 2025-05-15 PROCEDURE — 700111 HCHG RX REV CODE 636 W/ 250 OVERRIDE (IP): Performed by: STUDENT IN AN ORGANIZED HEALTH CARE EDUCATION/TRAINING PROGRAM

## 2025-05-15 PROCEDURE — 700111 HCHG RX REV CODE 636 W/ 250 OVERRIDE (IP): Mod: JZ | Performed by: OTOLARYNGOLOGY

## 2025-05-15 PROCEDURE — 110454 HCHG SHELL REV 250: Performed by: OTOLARYNGOLOGY

## 2025-05-15 PROCEDURE — 87186 SC STD MICRODIL/AGAR DIL: CPT | Mod: 91

## 2025-05-15 PROCEDURE — 87205 SMEAR GRAM STAIN: CPT

## 2025-05-15 PROCEDURE — A9270 NON-COVERED ITEM OR SERVICE: HCPCS

## 2025-05-15 PROCEDURE — 87075 CULTR BACTERIA EXCEPT BLOOD: CPT

## 2025-05-15 PROCEDURE — 88305 TISSUE EXAM BY PATHOLOGIST: CPT | Mod: 26 | Performed by: PATHOLOGY

## 2025-05-15 PROCEDURE — 160031 HCHG SURGERY MINUTES - 1ST 30 MINS LEVEL 5: Performed by: OTOLARYNGOLOGY

## 2025-05-15 PROCEDURE — 160009 HCHG ANES TIME/MIN: Performed by: OTOLARYNGOLOGY

## 2025-05-15 PROCEDURE — 700101 HCHG RX REV CODE 250: Performed by: STUDENT IN AN ORGANIZED HEALTH CARE EDUCATION/TRAINING PROGRAM

## 2025-05-15 PROCEDURE — 99233 SBSQ HOSP IP/OBS HIGH 50: CPT | Performed by: STUDENT IN AN ORGANIZED HEALTH CARE EDUCATION/TRAINING PROGRAM

## 2025-05-15 PROCEDURE — 09BS0ZZ EXCISION OF RIGHT FRONTAL SINUS, OPEN APPROACH: ICD-10-PCS | Performed by: OTOLARYNGOLOGY

## 2025-05-15 PROCEDURE — 700101 HCHG RX REV CODE 250: Performed by: OTOLARYNGOLOGY

## 2025-05-15 PROCEDURE — 87070 CULTURE OTHR SPECIMN AEROBIC: CPT

## 2025-05-15 PROCEDURE — 87077 CULTURE AEROBIC IDENTIFY: CPT | Mod: 91

## 2025-05-15 PROCEDURE — 88305 TISSUE EXAM BY PATHOLOGIST: CPT | Performed by: PATHOLOGY

## 2025-05-15 PROCEDURE — 88311 DECALCIFY TISSUE: CPT | Mod: 26 | Performed by: PATHOLOGY

## 2025-05-15 PROCEDURE — 160036 HCHG PACU - EA ADDL 30 MINS PHASE I: Performed by: OTOLARYNGOLOGY

## 2025-05-15 PROCEDURE — 88311 DECALCIFY TISSUE: CPT | Performed by: PATHOLOGY

## 2025-05-15 PROCEDURE — 099Q8ZZ DRAINAGE OF RIGHT MAXILLARY SINUS, VIA NATURAL OR ARTIFICIAL OPENING ENDOSCOPIC: ICD-10-PCS | Performed by: OTOLARYNGOLOGY

## 2025-05-15 RX ORDER — DIPHENHYDRAMINE HYDROCHLORIDE 50 MG/ML
12.5 INJECTION, SOLUTION INTRAMUSCULAR; INTRAVENOUS
Status: DISCONTINUED | OUTPATIENT
Start: 2025-05-15 | End: 2025-05-15 | Stop reason: HOSPADM

## 2025-05-15 RX ORDER — DEXMEDETOMIDINE HYDROCHLORIDE 100 UG/ML
INJECTION, SOLUTION INTRAVENOUS PRN
Status: DISCONTINUED | OUTPATIENT
Start: 2025-05-15 | End: 2025-05-15 | Stop reason: SURG

## 2025-05-15 RX ORDER — OXYCODONE HCL 5 MG/5 ML
5 SOLUTION, ORAL ORAL
Status: DISCONTINUED | OUTPATIENT
Start: 2025-05-15 | End: 2025-05-15 | Stop reason: HOSPADM

## 2025-05-15 RX ORDER — ONDANSETRON 2 MG/ML
4 INJECTION INTRAMUSCULAR; INTRAVENOUS
Status: COMPLETED | OUTPATIENT
Start: 2025-05-15 | End: 2025-05-15

## 2025-05-15 RX ORDER — HYDRALAZINE HYDROCHLORIDE 20 MG/ML
5 INJECTION INTRAMUSCULAR; INTRAVENOUS
Status: DISCONTINUED | OUTPATIENT
Start: 2025-05-15 | End: 2025-05-15 | Stop reason: HOSPADM

## 2025-05-15 RX ORDER — SODIUM CHLORIDE, SODIUM GLUCONATE, SODIUM ACETATE, POTASSIUM CHLORIDE AND MAGNESIUM CHLORIDE 526; 502; 368; 37; 30 MG/100ML; MG/100ML; MG/100ML; MG/100ML; MG/100ML
INJECTION, SOLUTION INTRAVENOUS
Status: DISCONTINUED | OUTPATIENT
Start: 2025-05-15 | End: 2025-05-15 | Stop reason: SURG

## 2025-05-15 RX ORDER — CEFAZOLIN SODIUM 1 G/3ML
INJECTION, POWDER, FOR SOLUTION INTRAMUSCULAR; INTRAVENOUS PRN
Status: DISCONTINUED | OUTPATIENT
Start: 2025-05-15 | End: 2025-05-15 | Stop reason: SURG

## 2025-05-15 RX ORDER — HYDROMORPHONE HYDROCHLORIDE 1 MG/ML
0.2 INJECTION, SOLUTION INTRAMUSCULAR; INTRAVENOUS; SUBCUTANEOUS
Status: DISCONTINUED | OUTPATIENT
Start: 2025-05-15 | End: 2025-05-15 | Stop reason: HOSPADM

## 2025-05-15 RX ORDER — OXYCODONE HYDROCHLORIDE 5 MG/1
5 TABLET ORAL
Refills: 0 | Status: DISCONTINUED | OUTPATIENT
Start: 2025-05-15 | End: 2025-05-16 | Stop reason: HOSPADM

## 2025-05-15 RX ORDER — ALBUTEROL SULFATE 5 MG/ML
2.5 SOLUTION RESPIRATORY (INHALATION)
Status: DISCONTINUED | OUTPATIENT
Start: 2025-05-15 | End: 2025-05-15 | Stop reason: HOSPADM

## 2025-05-15 RX ORDER — SODIUM CHLORIDE, SODIUM LACTATE, POTASSIUM CHLORIDE, CALCIUM CHLORIDE 600; 310; 30; 20 MG/100ML; MG/100ML; MG/100ML; MG/100ML
INJECTION, SOLUTION INTRAVENOUS
Status: DISCONTINUED | OUTPATIENT
Start: 2025-05-15 | End: 2025-05-15 | Stop reason: SURG

## 2025-05-15 RX ORDER — EPHEDRINE SULFATE 50 MG/ML
5 INJECTION, SOLUTION INTRAVENOUS
Status: DISCONTINUED | OUTPATIENT
Start: 2025-05-15 | End: 2025-05-15 | Stop reason: HOSPADM

## 2025-05-15 RX ORDER — PHENYLEPHRINE HCL IN 0.9% NACL 1 MG/10 ML
SYRINGE (ML) INTRAVENOUS PRN
Status: DISCONTINUED | OUTPATIENT
Start: 2025-05-15 | End: 2025-05-15 | Stop reason: SURG

## 2025-05-15 RX ORDER — HALOPERIDOL 5 MG/ML
1 INJECTION INTRAMUSCULAR
Status: DISCONTINUED | OUTPATIENT
Start: 2025-05-15 | End: 2025-05-15 | Stop reason: HOSPADM

## 2025-05-15 RX ORDER — ONDANSETRON 2 MG/ML
INJECTION INTRAMUSCULAR; INTRAVENOUS PRN
Status: DISCONTINUED | OUTPATIENT
Start: 2025-05-15 | End: 2025-05-15 | Stop reason: SURG

## 2025-05-15 RX ORDER — HYDROMORPHONE HYDROCHLORIDE 2 MG/ML
INJECTION, SOLUTION INTRAMUSCULAR; INTRAVENOUS; SUBCUTANEOUS PRN
Status: DISCONTINUED | OUTPATIENT
Start: 2025-05-15 | End: 2025-05-15 | Stop reason: SURG

## 2025-05-15 RX ORDER — DEXAMETHASONE SODIUM PHOSPHATE 4 MG/ML
INJECTION, SOLUTION INTRA-ARTICULAR; INTRALESIONAL; INTRAMUSCULAR; INTRAVENOUS; SOFT TISSUE PRN
Status: DISCONTINUED | OUTPATIENT
Start: 2025-05-15 | End: 2025-05-15 | Stop reason: SURG

## 2025-05-15 RX ORDER — OXYMETAZOLINE HYDROCHLORIDE 0.05 G/100ML
2 SPRAY NASAL
Status: DISCONTINUED | OUTPATIENT
Start: 2025-05-15 | End: 2025-05-16 | Stop reason: HOSPADM

## 2025-05-15 RX ORDER — LIDOCAINE HYDROCHLORIDE 20 MG/ML
INJECTION, SOLUTION EPIDURAL; INFILTRATION; INTRACAUDAL; PERINEURAL PRN
Status: DISCONTINUED | OUTPATIENT
Start: 2025-05-15 | End: 2025-05-15 | Stop reason: SURG

## 2025-05-15 RX ORDER — OXYCODONE HCL 5 MG/5 ML
10 SOLUTION, ORAL ORAL
Status: DISCONTINUED | OUTPATIENT
Start: 2025-05-15 | End: 2025-05-15 | Stop reason: HOSPADM

## 2025-05-15 RX ORDER — LABETALOL HYDROCHLORIDE 5 MG/ML
5 INJECTION, SOLUTION INTRAVENOUS
Status: DISCONTINUED | OUTPATIENT
Start: 2025-05-15 | End: 2025-05-15 | Stop reason: HOSPADM

## 2025-05-15 RX ORDER — OXYCODONE HYDROCHLORIDE 10 MG/1
10 TABLET ORAL
Refills: 0 | Status: DISCONTINUED | OUTPATIENT
Start: 2025-05-15 | End: 2025-05-16 | Stop reason: HOSPADM

## 2025-05-15 RX ORDER — HYDROMORPHONE HYDROCHLORIDE 1 MG/ML
0.4 INJECTION, SOLUTION INTRAMUSCULAR; INTRAVENOUS; SUBCUTANEOUS
Status: DISCONTINUED | OUTPATIENT
Start: 2025-05-15 | End: 2025-05-15 | Stop reason: HOSPADM

## 2025-05-15 RX ORDER — METHOCARBAMOL 100 MG/ML
1000 INJECTION, SOLUTION INTRAMUSCULAR; INTRAVENOUS
Status: DISCONTINUED | OUTPATIENT
Start: 2025-05-15 | End: 2025-05-15 | Stop reason: HOSPADM

## 2025-05-15 RX ORDER — HYDROMORPHONE HYDROCHLORIDE 1 MG/ML
0.1 INJECTION, SOLUTION INTRAMUSCULAR; INTRAVENOUS; SUBCUTANEOUS
Status: DISCONTINUED | OUTPATIENT
Start: 2025-05-15 | End: 2025-05-15 | Stop reason: HOSPADM

## 2025-05-15 RX ORDER — HYDROMORPHONE HYDROCHLORIDE 1 MG/ML
0.5 INJECTION, SOLUTION INTRAMUSCULAR; INTRAVENOUS; SUBCUTANEOUS
Status: DISCONTINUED | OUTPATIENT
Start: 2025-05-15 | End: 2025-05-16 | Stop reason: HOSPADM

## 2025-05-15 RX ORDER — ROCURONIUM BROMIDE 10 MG/ML
INJECTION, SOLUTION INTRAVENOUS PRN
Status: DISCONTINUED | OUTPATIENT
Start: 2025-05-15 | End: 2025-05-15 | Stop reason: SURG

## 2025-05-15 RX ORDER — MIDAZOLAM HYDROCHLORIDE 1 MG/ML
INJECTION INTRAMUSCULAR; INTRAVENOUS PRN
Status: DISCONTINUED | OUTPATIENT
Start: 2025-05-15 | End: 2025-05-15 | Stop reason: SURG

## 2025-05-15 RX ORDER — EPINEPHRINE 0.1 MG/ML
SYRINGE (ML) INJECTION
Status: DISCONTINUED | OUTPATIENT
Start: 2025-05-15 | End: 2025-05-15 | Stop reason: HOSPADM

## 2025-05-15 RX ADMIN — CEFAZOLIN 3 G: 1 INJECTION, POWDER, FOR SOLUTION INTRAMUSCULAR; INTRAVENOUS at 14:12

## 2025-05-15 RX ADMIN — Medication 100 MCG: at 14:50

## 2025-05-15 RX ADMIN — THROMBIN, TOPICAL (BOVINE) 5000 UNITS: KIT at 14:34

## 2025-05-15 RX ADMIN — HYDROMORPHONE HYDROCHLORIDE 0.2 MG: 2 INJECTION INTRAMUSCULAR; INTRAVENOUS; SUBCUTANEOUS at 16:25

## 2025-05-15 RX ADMIN — HYDROMORPHONE HYDROCHLORIDE 0.4 MG: 2 INJECTION INTRAMUSCULAR; INTRAVENOUS; SUBCUTANEOUS at 16:49

## 2025-05-15 RX ADMIN — ACETAMINOPHEN 650 MG: 325 TABLET ORAL at 21:39

## 2025-05-15 RX ADMIN — FLUORESCEIN SODIUM 1 STRIP: 1 STRIP OPHTHALMIC at 14:44

## 2025-05-15 RX ADMIN — FENTANYL CITRATE 100 MCG: 50 INJECTION, SOLUTION INTRAMUSCULAR; INTRAVENOUS at 14:05

## 2025-05-15 RX ADMIN — HYDROMORPHONE HYDROCHLORIDE 0.2 MG: 2 INJECTION INTRAMUSCULAR; INTRAVENOUS; SUBCUTANEOUS at 15:14

## 2025-05-15 RX ADMIN — FENTANYL CITRATE 50 MCG: 50 INJECTION, SOLUTION INTRAMUSCULAR; INTRAVENOUS at 17:55

## 2025-05-15 RX ADMIN — SUGAMMADEX 200 MG: 100 INJECTION, SOLUTION INTRAVENOUS at 17:01

## 2025-05-15 RX ADMIN — SODIUM CHLORIDE, POTASSIUM CHLORIDE, SODIUM LACTATE AND CALCIUM CHLORIDE: 600; 310; 30; 20 INJECTION, SOLUTION INTRAVENOUS at 14:00

## 2025-05-15 RX ADMIN — ROCURONIUM BROMIDE 80 MG: 10 INJECTION INTRAVENOUS at 14:08

## 2025-05-15 RX ADMIN — EPINEPHRINE 30 MG: 0.1 INJECTION, SOLUTION INTRAVENOUS at 15:15

## 2025-05-15 RX ADMIN — ROCURONIUM BROMIDE 10 MG: 10 INJECTION INTRAVENOUS at 16:25

## 2025-05-15 RX ADMIN — HALOPERIDOL LACTATE 1 MG: 5 INJECTION, SOLUTION INTRAMUSCULAR at 18:44

## 2025-05-15 RX ADMIN — Medication 100 MCG: at 14:21

## 2025-05-15 RX ADMIN — HYDROMORPHONE HYDROCHLORIDE 0.2 MG: 2 INJECTION INTRAMUSCULAR; INTRAVENOUS; SUBCUTANEOUS at 15:48

## 2025-05-15 RX ADMIN — ROCURONIUM BROMIDE 20 MG: 10 INJECTION INTRAVENOUS at 14:28

## 2025-05-15 RX ADMIN — Medication 100 MCG: at 14:54

## 2025-05-15 RX ADMIN — MIDAZOLAM HYDROCHLORIDE 2 MG: 1 INJECTION, SOLUTION INTRAMUSCULAR; INTRAVENOUS at 14:00

## 2025-05-15 RX ADMIN — ROCURONIUM BROMIDE 20 MG: 10 INJECTION INTRAVENOUS at 15:14

## 2025-05-15 RX ADMIN — HYDROMORPHONE HYDROCHLORIDE 1 MG: 2 INJECTION INTRAMUSCULAR; INTRAVENOUS; SUBCUTANEOUS at 14:28

## 2025-05-15 RX ADMIN — SODIUM CHLORIDE, SODIUM GLUCONATE, SODIUM ACETATE, POTASSIUM CHLORIDE AND MAGNESIUM CHLORIDE: 526; 502; 368; 37; 30 INJECTION, SOLUTION INTRAVENOUS at 16:31

## 2025-05-15 RX ADMIN — LIDOCAINE HYDROCHLORIDE 100 MG: 20 INJECTION, SOLUTION EPIDURAL; INFILTRATION; INTRACAUDAL; PERINEURAL at 14:08

## 2025-05-15 RX ADMIN — PROPOFOL 300 MG: 10 INJECTION, EMULSION INTRAVENOUS at 14:08

## 2025-05-15 RX ADMIN — Medication 100 MCG: at 14:32

## 2025-05-15 RX ADMIN — DEXMEDETOMIDINE 10 MCG: 100 INJECTION, SOLUTION INTRAVENOUS at 14:10

## 2025-05-15 RX ADMIN — ONDANSETRON 4 MG: 2 INJECTION INTRAMUSCULAR; INTRAVENOUS at 18:23

## 2025-05-15 RX ADMIN — DEXMEDETOMIDINE 10 MCG: 100 INJECTION, SOLUTION INTRAVENOUS at 15:17

## 2025-05-15 RX ADMIN — ONDANSETRON 4 MG: 2 INJECTION INTRAMUSCULAR; INTRAVENOUS at 17:01

## 2025-05-15 RX ADMIN — DEXMEDETOMIDINE 10 MCG: 100 INJECTION, SOLUTION INTRAVENOUS at 14:15

## 2025-05-15 RX ADMIN — DEXMEDETOMIDINE 10 MCG: 100 INJECTION, SOLUTION INTRAVENOUS at 16:25

## 2025-05-15 RX ADMIN — DEXMEDETOMIDINE 10 MCG: 100 INJECTION, SOLUTION INTRAVENOUS at 14:08

## 2025-05-15 RX ADMIN — DEXAMETHASONE SODIUM PHOSPHATE 8 MG: 4 INJECTION INTRA-ARTICULAR; INTRALESIONAL; INTRAMUSCULAR; INTRAVENOUS; SOFT TISSUE at 14:12

## 2025-05-15 RX ADMIN — ROCURONIUM BROMIDE 20 MG: 10 INJECTION INTRAVENOUS at 15:48

## 2025-05-15 ASSESSMENT — ENCOUNTER SYMPTOMS
NERVOUS/ANXIOUS: 1
HEADACHES: 1
ABDOMINAL PAIN: 0
VOMITING: 0
BACK PAIN: 0
NECK PAIN: 0
SHORTNESS OF BREATH: 0
MYALGIAS: 0
DIZZINESS: 0
NAUSEA: 1
BLURRED VISION: 1
DEPRESSION: 0

## 2025-05-15 ASSESSMENT — PAIN DESCRIPTION - PAIN TYPE
TYPE: SURGICAL PAIN
TYPE: SURGICAL PAIN;ACUTE PAIN
TYPE: SURGICAL PAIN

## 2025-05-15 NOTE — CONSULTS
"CC: double vision    HPI: Gurjit Burgess is a 19 y.o. male with 4-5 days of double vision. Also noticed he could not move his right eye well, having headaches, and pain with eye movement mostly looking up. Denies prior head trauma or surgery. Denies chronic nasal congestion or recurrent sinusitis. Works as a 's assistant.     Past Medical History[1]  Past Surgical History[2]  Medications Ordered Prior to Encounter[3]  Allergies[4]  Social Hx - Family and Occupational[5]      O:  /69   Pulse 60   Temp 36.3 °C (97.4 °F) (Temporal)   Resp 18   Ht 1.803 m (5' 11\")   Wt (!) 129 kg (283 lb 15.2 oz)   SpO2 96%   Gen: interactive and appropriate  Pulm: Breathing comfortably on RA without stridor or stertor  Face: symmetric, no edema, facial strength intact bilaterally  Eyes: right eye proptotic vs left. Decreased EOMI, particularly looking up, conjunctiva clear, no chemosis.   Ears: pinna normal. Hearing grossly intact  Nose: patent, with moist mucosa. no purulence or edema.   OC/OP: clear, no masses. Dentition intact.   Neck: flat, no discrete masses  Neuro: cranial nerves grossly intact bilaterally. Mood appropriate      Recent Labs     05/13/25 2044   WBC 10.0   RBC 5.85   HEMOGLOBIN 16.0   HEMATOCRIT 47.5   MCV 81.2*   MCH 27.4   MCHC 33.7   RDW 40.7   PLATELETCT 274   MPV 10.7     Recent Labs     05/13/25 2044   SODIUM 140   POTASSIUM 3.7   CHLORIDE 105   CO2 23   GLUCOSE 119*   BUN 15   CREATININE 0.78   CALCIUM 9.2     Recent Labs     05/13/25 2044   APTT 27.9   INR 0.97     CT and MRI reviewed      A/P:Gurjit Burgess is a 19 y.o. male with large right frontoethmoid lesion, likely mucocele with mass effect on right orbit and right frontal lobe, also with associated chronic sinusitis bilaterally and septal deviation to the left. Unclear etiology of mucocele given no prior history of trauma or surgery, but imaging suggestive of underlying polypoid disease which may be contributing. " Discussed options with patient. Given acute vision changes and mass effect on frontal lobe, discussed endoscopic sinus surgery. Mucocele can hopefully be marsupialized endoscopically along with opening all sinuses. Discussed possible need for craniotomy and pericranial flap for reconstruction if there is CSF leak or if lesion turns out to be more complex than mucocele. Surgery discussed in detail, including risks. Will proceed today. Neurosurgery, Dr. Will is available for possible craniotomy if needed. All questions answered.     Thank you for the consultation. Please call with questions or concerns.    Ofe Atkinson M.D.  [phone]         [1]   Past Medical History:  Diagnosis Date    Asthma     inhaler    Delayed emergence from general anesthesia     very young 3-4    Pain     RIGHT knee moderate pain   [2]   Past Surgical History:  Procedure Laterality Date    PB KNEE SCOPE,DIAGNOSTIC Right 8/13/2020    Procedure: ARTHROSCOPY, KNEE - WITH MCCLELLAND;  Surgeon: Tyler Cedillo M.D.;  Location: SURGERY [facility];  Service: Orthopedics    LIGAMENT REPAIR Right 8/13/2020    Procedure: REPAIR, LIGAMENT - FOR OPEN MEDIAL PATELLOFEMORAL LIGAMENT RECONSTRUCTION WIHT SOFT TISSUE ALLOGRAFT;  Surgeon: Tyler Cedillo M.D.;  Location: SURGERY [facility];  Service: Orthopedics    ORIF, ELBOW Right 2008    OTHER      R elbow surgery   [3]   No current facility-administered medications on file prior to encounter.     No current outpatient medications on file prior to encounter.   [4]   Allergies  Allergen Reactions    Amoxicillin      Generalized swelling   [5]   Family and Occupational History  Socioeconomic History    Marital status: Single     Spouse name: Not on file    Number of children: Not on file    Years of education: Not on file    Highest education level: Not on file   Occupational History    Not on file

## 2025-05-15 NOTE — ANESTHESIA PREPROCEDURE EVALUATION
Case: [id number] Date/Time: 05/15/25 1458    Procedures:       FESS (FUNCTIONAL ENDOSCOPIC SINUS SURGERY) STEALTH      CRANIOTOMY    Location: [facility] OR 05 / SURGERY [facility]    Surgeons: Ofe Atkinson M.D.; Feliciano Will M.D.            19M h/o asthma, large right frontoethmoid mucocele with mass effect on right orbit and right frontal lobe, chronic sinusitis bilaterally and septal deviation to the left    Double vision, headaches    Relevant Problems   PULMONARY   (positive) Asthma      Other   (positive) Diplopia   (positive) Mucocele of ethmoid sinus       Physical Exam    Airway   Mallampati: II  TM distance: >3 FB  Neck ROM: full       Cardiovascular - normal exam  Rhythm: regular  Rate: normal    (-) murmur     Dental - normal exam           Pulmonary - normal exam   Abdominal    Neurological - normal exam                   Anesthesia Plan    ASA 2       Plan - general       Airway plan will be ETT          Induction: intravenous    Postoperative Plan: Postoperative administration of opioids is intended.    Pertinent diagnostic labs and testing reviewed    Informed Consent:    Anesthetic plan and risks discussed with patient.    Use of blood products discussed with: patient whom consented to blood products.

## 2025-05-15 NOTE — CARE PLAN
The patient is Stable - Low risk of patient condition declining or worsening    Shift Goals  Clinical Goals: Continue neuro checks.  Patient will remain free from injury throughout shift  Patient Goals: rest  Family Goals: updates    Patient A & O x 4, utilizes call light appropriately. Patient ambulating without issue.  NPO since midnight.  Bed in low and locked position, call light in reach, rounded on hourly.    Progress made toward(s) clinical / shift goals:    Problem: Knowledge Deficit - Standard  Goal: Patient and family/care givers will demonstrate understanding of plan of care, disease process/condition, diagnostic tests and medications  Outcome: Progressing       Patient is not progressing towards the following goals:

## 2025-05-15 NOTE — PROGRESS NOTES
Diplopia.  [facility] Medicine Daily Progress Note    Date of Service  5/15/2025    Chief Complaint  Gurjit Burgess is a 19 y.o. male admitted 5/13/2025 with double vision    Hospital Course  Gurjit Burgess is a 19-year-old male with unremarkable PMHx.  Admitted 5/13 for double vision.    Per history:  Patient states that all of a sudden a couple of days ago he noticed that it was hard for him to focus. He felt like there was maybe dirt or an eyelash in his right eye. When he went to go look at a mirror, he noticed that it seemed that his right eye was not aligned and more droopy than the left. He reports associated eye pain and pain along the right side of his nose near his eye that comes and goes. He denies any blurry vision. He denies trauma to the area.     In the ED: Vital stable.  CTA head and neck without acute processes.  Neurology was consulted and recommended MRI and formal neurology consult.    Interval Problem Update    BRITANY ON  He spoke with Neurosurgeon Dr. Will and is nervous about surgery this afternoon.  He reports ongoing blurry vision, headache, nausea.  He denies ear pain, tinnitus, vomiting, bowel/bladder dysfunction, pain, dyspnea.    No new labs nor imaging.    To OR today for endoscopic sinus surgery with possible need for craniotomy and pericranial reconstruction. He is undergoing general anesthesia.    I have discussed this patient's plan of care and discharge plan at IDT rounds today with Case Management, Nursing, Nursing leadership, and other members of the IDT team.    Consultants/Specialty  ENT and neurosurgery    Code Status  Full Code    Disposition  The patient is not medically cleared for discharge to home or a post-acute facility.  Anticipate discharge to: home with close outpatient follow-up    I have placed the appropriate orders for post-discharge needs.    Review of Systems  Review of Systems   Constitutional:  Negative for malaise/fatigue.   Eyes:  Positive for blurred  vision.   Respiratory:  Negative for shortness of breath.    Cardiovascular:  Negative for chest pain.   Gastrointestinal:  Positive for nausea. Negative for abdominal pain and vomiting.   Musculoskeletal:  Negative for back pain, joint pain, myalgias and neck pain.   Neurological:  Positive for headaches. Negative for dizziness.   Psychiatric/Behavioral:  Negative for depression. The patient is nervous/anxious.         Physical Exam  Temp:  [36.3 °C (97.4 °F)-36.8 °C (98.3 °F)] 36.7 °C (98 °F)  Pulse:  [56-75] 56  Resp:  [17-18] 17  BP: (121-138)/(69-80) 124/75  SpO2:  [96 %-97 %] 97 %    Physical Exam  Vitals and nursing note reviewed.   Constitutional:       General: He is not in acute distress.     Appearance: Normal appearance. He is obese. He is not ill-appearing.   HENT:      Head: Normocephalic.      Nose: Nose normal.      Mouth/Throat:      Mouth: Mucous membranes are dry.   Eyes:      General: No scleral icterus.     Conjunctiva/sclera: Conjunctivae normal.      Comments: Mild right proptosis is present   Cardiovascular:      Rate and Rhythm: Normal rate and regular rhythm.      Pulses: Normal pulses.      Heart sounds: Normal heart sounds. No murmur heard.     No friction rub. No gallop.   Pulmonary:      Effort: Pulmonary effort is normal. No respiratory distress.      Breath sounds: Normal breath sounds. No wheezing, rhonchi or rales.   Abdominal:      General: Abdomen is protuberant. Bowel sounds are normal. There is no distension.      Palpations: Abdomen is soft.      Tenderness: There is no abdominal tenderness. There is no guarding or rebound.   Genitourinary:     Comments: No saravia  Musculoskeletal:      Cervical back: Neck supple.      Right lower leg: No edema.      Left lower leg: No edema.   Skin:     General: Skin is warm and dry.   Neurological:      Mental Status: He is alert.      Comments: Appropriately conversant, moves all extremities   Psychiatric:         Mood and Affect: Mood  normal.         Behavior: Behavior normal.         Thought Content: Thought content normal.         Judgment: Judgment normal.         Fluids    Intake/Output Summary (Last 24 hours) at 5/15/2025 1156  Last data filed at 5/14/2025 2000  Gross per 24 hour   Intake 960 ml   Output --   Net 960 ml        Laboratory  Recent Labs     05/13/25 2044   WBC 10.0   RBC 5.85   HEMOGLOBIN 16.0   HEMATOCRIT 47.5   MCV 81.2*   MCH 27.4   MCHC 33.7   RDW 40.7   PLATELETCT 274   MPV 10.7     Recent Labs     05/13/25 2044   SODIUM 140   POTASSIUM 3.7   CHLORIDE 105   CO2 23   GLUCOSE 119*   BUN 15   CREATININE 0.78   CALCIUM 9.2     Recent Labs     05/13/25 2044   APTT 27.9   INR 0.97               Imaging  CT-STEALTH HEAD W/O   Final Result      1.  Stealth protocol for surgical planning.   2.  Expansile mass occupying the right frontoethmoid sinus with bony destruction at the right medial orbit and posterior right frontal sinus wall compatible with mucocele. Best seen on MRI.               MR-BRAIN-WITH & W/O   Final Result      1.  There is an approximately 48 x 32 x 42 mm sized lobulated T2 hyperintense and T1 hypointense lesion noted involving right ethmoid and frontal sinuses with erosion of medial wall of the right orbit and posterior wall of the right frontal sinus. The    lesion extends into the right medial orbital extraconal space and causing mass effect upon the medial rectus muscle. Right-sided proptosis is seen. Posteriorly, the lesion extends into the extradural space in the right frontal region and causing mass    effect upon the right frontal lobe. There is no enhancement. This finding likely represent a frontoethmoidal mucocele. There is mild dural enhancement noted in the right frontal region likely secondary to the inflammation.   2.  Fluid/mucosal thickening in the left frontal, left ethmoid and right maxillary sinuses.      MR-ORBITS WITH AND WITHOUT SEQUENCES   Final Result      1.  There is an  approximately 48 x 32 x 42 mm sized lobulated T2 hyperintense and T1 hypointense lesion noted involving right ethmoid and frontal sinuses with erosion of medial wall of the right orbit and posterior wall of the right frontal sinus. The    lesion extends into the right medial orbital extraconal space and causing mass effect upon the medial rectus muscle. Right-sided proptosis is seen. Posteriorly, the lesion extends into the extradural space in the right frontal region and causing mass    effect upon the right frontal lobe. There is no enhancement. This finding likely represent a frontoethmoidal mucocele. There is mild dural enhancement noted in the right frontal region likely secondary to the inflammation.   2.  Fluid/mucosal thickening in the left frontal, left ethmoid and right maxillary sinuses.      CT-CTA NECK WITH & W/O-POST PROCESSING   Final Result      1. No evidence of flow-limiting stenosis in the cervical carotid or cervical vertebral arteries.      CT-CTA HEAD WITH & W/O-POST PROCESS   Final Result         1. No hemodynamically significant narrowing of the major intracranial vessels.           Assessment/Plan  * Mucocele of ethmoid sinus- (present on admission)  Assessment & Plan  MRI brain and orbits:  There is an approximately 48 x 32 x 42 mm sized lobulated T2 hyperintense and T1 hypointense lesion noted involving right ethmoid and frontal sinuses with erosion of medial wall of the right orbit and posterior wall of the right frontal sinus. Likely an frontoethmoidal mucocele.  - ENT consulted, to OR 5/15/25 for marsupialization of ethmoid mucocele  - Neurosurgery consulted for assistance with craniotomy / cranioplasty if needed    Diplopia- (present on admission)  Assessment & Plan  Secondary to frontoethmoidal mucocele       My total time spent caring for the patient on the day of the encounter was 59 minutes.   This does not include time spent on separately billable procedures/tests.      VTE  prophylaxis:   SCDs/TEDs   pharmacologic prophylaxis contraindicated due to surgery today, low risk    I have performed a physical exam and reviewed and updated ROS and Plan today (5/15/2025). In review of yesterday's note (5/14/2025), there are no changes except as documented above.

## 2025-05-15 NOTE — ANESTHESIA PROCEDURE NOTES
Airway    Date/Time: 5/15/2025 2:10 PM    Performed by: Ivonne Santilaln M.D.  Authorized by: Ivonne Santillan M.D.    Location:  OR  Urgency:  Elective  Indications for Airway Management:  Anesthesia      Spontaneous Ventilation: absent    Sedation Level:  Deep  Preoxygenated: Yes    Patient Position:  Sniffing  Mask Difficulty Assessment:  1 - vent by mask  Final Airway Type:  Endotracheal airway  Final Endotracheal Airway:  ETT  Cuffed: Yes    Technique Used for Successful ETT Placement:  Direct laryngoscopy  Devices/Methods Used in Placement:  Anterior pressure/BURP    Insertion Site:  Oral  Blade Type:  Shikha  Laryngoscope Blade/Videolaryngoscope Blade Size:  4  ETT Size (mm):  7.5  Measured from:  Teeth  ETT to Teeth (cm):  22  Placement Verified by: auscultation and capnometry    Cormack-Lehane Classification:  Grade IIa - partial view of glottis  Number of Attempts at Approach:  1

## 2025-05-15 NOTE — CONSULTS
Chief complaint mucocele with erosion of skull base  Brief HPI is 19-year-old who presents with diplopia he did not notice the proptosis but his right eye is lobe is being displaced anteriorly and laterally causing visual disturbance he is not having any acuity issues with the eye because of this he presented to the ER where CT scan demonstrated a large mucocele extending up into the frontal sinus and then down through the lamina Propecia into the right orbit and ethmoids patient has not had any CSF leak or sign of drainage he is not having any pain he has not had any symptoms of infection ENT was consulted on the patient and they feel that they may be able to address this entirely endonasal he but wanted us on standby in case there is too large of a cranial defect and requires a paracranial graft or some form of muscular graft with paracranial graft to obliterate the frontal sinus away from the cranial vault.    12 point review of systems is positive for diplopia he has no other symptoms of eye pain vision visual acuity loss headaches or CSF drainage.    Past medical history noncontributory  Past surgical history noncontributory  Medications noncontributory.    Physical examination the patient has proptotic right eye that seems to be anteriorly and laterally does displaced his double vision but he has no acuity issues cranial nerves are grossly intact motor examination nonfocal.    Imaging is a MRI of the brain with without contrast which shows a large right mucocele extending into the frontal sinus down to the ethmoids with erosion of the skull base and lamina propria show of the right orbit and extension in the orbital cavity with displacement of the right eye.    Assessment and plan  At this time we will be available for surgical assist for Dr. Newberry who is planning to take the patient today at 2 for a endonasal approach for resection of mucocele and repair of anterior skull base if the defect is too large for  a septal flap or for typical reconstruction we will be available for the OR and we plan to perform a     frontal craniotomy for exenteration of frontal sinus with paracranial graft and muscle graft for obliteration of frontal ostia.    Please make n.p.o.  Total of 55 minutes direct patient care coronation consultation evaluation we will be available for surgical assist today

## 2025-05-16 ENCOUNTER — PATIENT OUTREACH (OUTPATIENT)
Dept: SCHEDULING | Facility: IMAGING CENTER | Age: 20
End: 2025-05-16
Payer: OTHER MISCELLANEOUS

## 2025-05-16 VITALS
SYSTOLIC BLOOD PRESSURE: 122 MMHG | OXYGEN SATURATION: 92 % | TEMPERATURE: 99.4 F | BODY MASS INDEX: 39.75 KG/M2 | DIASTOLIC BLOOD PRESSURE: 79 MMHG | WEIGHT: 283.95 LBS | HEART RATE: 89 BPM | HEIGHT: 71 IN | RESPIRATION RATE: 20 BRPM

## 2025-05-16 PROBLEM — H53.2 DIPLOPIA: Status: RESOLVED | Noted: 2025-05-13 | Resolved: 2025-05-16

## 2025-05-16 PROBLEM — J34.1 MUCOCELE OF ETHMOID SINUS: Status: RESOLVED | Noted: 2025-05-14 | Resolved: 2025-05-16

## 2025-05-16 PROBLEM — J45.20 MILD INTERMITTENT ASTHMA WITHOUT COMPLICATION: Status: ACTIVE | Noted: 2025-05-15

## 2025-05-16 LAB
ALBUMIN SERPL BCP-MCNC: 3.9 G/DL (ref 3.2–4.9)
ALBUMIN/GLOB SERPL: 1.2 G/DL
ALP SERPL-CCNC: 67 U/L (ref 30–99)
ALT SERPL-CCNC: 69 U/L (ref 2–50)
ANION GAP SERPL CALC-SCNC: 11 MMOL/L (ref 7–16)
AST SERPL-CCNC: 41 U/L (ref 12–45)
BILIRUB SERPL-MCNC: 0.7 MG/DL (ref 0.1–1.5)
BUN SERPL-MCNC: 15 MG/DL (ref 8–22)
CALCIUM ALBUM COR SERPL-MCNC: 8.9 MG/DL (ref 8.5–10.5)
CALCIUM SERPL-MCNC: 8.8 MG/DL (ref 8.5–10.5)
CHLORIDE SERPL-SCNC: 102 MMOL/L (ref 96–112)
CO2 SERPL-SCNC: 24 MMOL/L (ref 20–33)
CREAT SERPL-MCNC: 0.8 MG/DL (ref 0.5–1.4)
ERYTHROCYTE [DISTWIDTH] IN BLOOD BY AUTOMATED COUNT: 41.4 FL (ref 35.9–50)
GFR SERPLBLD CREATININE-BSD FMLA CKD-EPI: 129 ML/MIN/1.73 M 2
GLOBULIN SER CALC-MCNC: 3.3 G/DL (ref 1.9–3.5)
GLUCOSE SERPL-MCNC: 127 MG/DL (ref 65–99)
GRAM STN SPEC: NORMAL
HCT VFR BLD AUTO: 45.7 % (ref 42–52)
HGB BLD-MCNC: 14.7 G/DL (ref 14–18)
MCH RBC QN AUTO: 26.8 PG (ref 27–33)
MCHC RBC AUTO-ENTMCNC: 32.2 G/DL (ref 32.3–36.5)
MCV RBC AUTO: 83.2 FL (ref 81.4–97.8)
PATHOLOGY CONSULT NOTE: NORMAL
PLATELET # BLD AUTO: 285 K/UL (ref 164–446)
PMV BLD AUTO: 10.6 FL (ref 9–12.9)
POTASSIUM SERPL-SCNC: 4.1 MMOL/L (ref 3.6–5.5)
PROT SERPL-MCNC: 7.2 G/DL (ref 6–8.2)
RBC # BLD AUTO: 5.49 M/UL (ref 4.7–6.1)
SIGNIFICANT IND 70042: NORMAL
SITE SITE: NORMAL
SODIUM SERPL-SCNC: 137 MMOL/L (ref 135–145)
SOURCE SOURCE: NORMAL
WBC # BLD AUTO: 14.4 K/UL (ref 4.8–10.8)

## 2025-05-16 PROCEDURE — 85027 COMPLETE CBC AUTOMATED: CPT

## 2025-05-16 PROCEDURE — 700111 HCHG RX REV CODE 636 W/ 250 OVERRIDE (IP): Performed by: STUDENT IN AN ORGANIZED HEALTH CARE EDUCATION/TRAINING PROGRAM

## 2025-05-16 PROCEDURE — 700111 HCHG RX REV CODE 636 W/ 250 OVERRIDE (IP): Mod: JZ | Performed by: NURSE PRACTITIONER

## 2025-05-16 PROCEDURE — 80053 COMPREHEN METABOLIC PANEL: CPT

## 2025-05-16 PROCEDURE — 700102 HCHG RX REV CODE 250 W/ 637 OVERRIDE(OP): Performed by: STUDENT IN AN ORGANIZED HEALTH CARE EDUCATION/TRAINING PROGRAM

## 2025-05-16 PROCEDURE — A9270 NON-COVERED ITEM OR SERVICE: HCPCS | Performed by: STUDENT IN AN ORGANIZED HEALTH CARE EDUCATION/TRAINING PROGRAM

## 2025-05-16 PROCEDURE — 700111 HCHG RX REV CODE 636 W/ 250 OVERRIDE (IP): Performed by: OTOLARYNGOLOGY

## 2025-05-16 PROCEDURE — A9270 NON-COVERED ITEM OR SERVICE: HCPCS | Performed by: OTOLARYNGOLOGY

## 2025-05-16 PROCEDURE — 700102 HCHG RX REV CODE 250 W/ 637 OVERRIDE(OP): Performed by: OTOLARYNGOLOGY

## 2025-05-16 PROCEDURE — 99239 HOSP IP/OBS DSCHRG MGMT >30: CPT | Performed by: STUDENT IN AN ORGANIZED HEALTH CARE EDUCATION/TRAINING PROGRAM

## 2025-05-16 RX ORDER — ECHINACEA PURPUREA EXTRACT 125 MG
2 TABLET ORAL 3 TIMES DAILY
Status: DISCONTINUED | OUTPATIENT
Start: 2025-05-16 | End: 2025-05-16 | Stop reason: HOSPADM

## 2025-05-16 RX ORDER — OXYMETAZOLINE HYDROCHLORIDE 0.05 G/100ML
2 SPRAY NASAL
COMMUNITY
Start: 2025-05-16

## 2025-05-16 RX ORDER — DEXAMETHASONE SODIUM PHOSPHATE 4 MG/ML
10 INJECTION, SOLUTION INTRA-ARTICULAR; INTRALESIONAL; INTRAMUSCULAR; INTRAVENOUS; SOFT TISSUE ONCE
Status: COMPLETED | OUTPATIENT
Start: 2025-05-16 | End: 2025-05-16

## 2025-05-16 RX ORDER — PREDNISONE 10 MG/1
TABLET ORAL
Qty: 21 TABLET | Refills: 0 | Status: SHIPPED | OUTPATIENT
Start: 2025-05-17 | End: 2025-05-28

## 2025-05-16 RX ORDER — ECHINACEA PURPUREA EXTRACT 125 MG
2 TABLET ORAL 3 TIMES DAILY
COMMUNITY
Start: 2025-05-16

## 2025-05-16 RX ORDER — ACETAMINOPHEN 500 MG
1000 TABLET ORAL EVERY 6 HOURS PRN
COMMUNITY
Start: 2025-05-16

## 2025-05-16 RX ORDER — OXYCODONE HYDROCHLORIDE 5 MG/1
5 TABLET ORAL EVERY 4 HOURS PRN
Qty: 10 TABLET | Refills: 0 | Status: SHIPPED | OUTPATIENT
Start: 2025-05-16 | End: 2025-05-19

## 2025-05-16 RX ORDER — ONDANSETRON 2 MG/ML
4 INJECTION INTRAMUSCULAR; INTRAVENOUS EVERY 4 HOURS PRN
Status: DISCONTINUED | OUTPATIENT
Start: 2025-05-16 | End: 2025-05-16 | Stop reason: HOSPADM

## 2025-05-16 RX ADMIN — DEXAMETHASONE SODIUM PHOSPHATE 10 MG: 4 INJECTION INTRA-ARTICULAR; INTRALESIONAL; INTRAMUSCULAR; INTRAVENOUS; SOFT TISSUE at 07:36

## 2025-05-16 RX ADMIN — SALINE NASAL SPRAY 2 SPRAY: 1.5 SOLUTION NASAL at 09:43

## 2025-05-16 RX ADMIN — PREDNISONE 30 MG: 20 TABLET ORAL at 10:48

## 2025-05-16 RX ADMIN — ONDANSETRON 4 MG: 2 INJECTION INTRAMUSCULAR; INTRAVENOUS at 00:56

## 2025-05-16 RX ADMIN — OXYCODONE 5 MG: 5 TABLET ORAL at 00:09

## 2025-05-16 RX ADMIN — Medication 2 SPRAY: at 00:56

## 2025-05-16 ASSESSMENT — PATIENT HEALTH QUESTIONNAIRE - PHQ9
1. LITTLE INTEREST OR PLEASURE IN DOING THINGS: NOT AT ALL
2. FEELING DOWN, DEPRESSED, IRRITABLE, OR HOPELESS: NOT AT ALL
SUM OF ALL RESPONSES TO PHQ9 QUESTIONS 1 AND 2: 0

## 2025-05-16 ASSESSMENT — PAIN DESCRIPTION - PAIN TYPE
TYPE: ACUTE PAIN
TYPE: ACUTE PAIN

## 2025-05-16 NOTE — DISCHARGE SUMMARY
Discharge Summary    CHIEF COMPLAINT ON ADMISSION  Chief Complaint   Patient presents with    Blurred Vision     Pt states for a few days he's been having issues seeing out of his right eye, feels like it is cross eyed at times. Denies any trauma. 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        Reason for Admission  Ethmoid Mucocele    Admission Date  5/13/2025    CODE STATUS  Full Code    HPI & HOSPITAL COURSE    Gurjit Burgess is a 19-year-old male who presented with blurred vision and proptosis.    He presented due to a few days of blurry vision and visible proptosis of his Right eye. In the ED he presented hypertensive but otherwise normal vitals. CTA H&N were without vessel occlusion. MRI brain and orbits demonstrated a lobulated lusion of the Right ethmoid and frontal sinuses extending in to the orbital extraconal space and causing mass effect on the medial rectus muscle. CBC, CMP, and INR were normal. ENT was consulted and recommended FESS in collaboration with Neurosurgery in case craniotomy for CSF leak repair was required. He underwent FESS 5/15/25 by Dr. Atkinson without complication. Gram stain from surgical cultures in no organisms and no growth at time of discharge. Post-procedure he received dexamethasone and was advised for prednisone steroid taper and nasal saline hygiene per ENT.    Therefore, he is discharged in fair and stable condition to home with close outpatient follow-up.    The patient met 2-midnight criteria for an inpatient stay at the time of discharge.    Discharge Date  5/16/25    FOLLOW UP ITEMS POST DISCHARGE    Ethmoid Mucocele - follow up with Dr. Atkinson in 2 weeks    DISCHARGE DIAGNOSES  Principal Problem (Resolved):    Mucocele of ethmoid sinus (POA: Yes)  Active Problems:    Mild intermittent asthma without complication (POA: Yes)  Resolved Problems:    Diplopia (POA: Yes)      FOLLOW UP  No future appointments.  Ofe Atkinson M.D.  [address] [place] [address]  [id number]  [phone]    Schedule an appointment as soon as possible for a visit in 2 week(s)  after-surgery follow up      MEDICATIONS ON DISCHARGE     Medication List        START taking these medications        Instructions   acetaminophen 500 MG Tabs  Commonly known as: Tylenol   Take 2 Tablets by mouth every 6 hours as needed for Mild Pain.  Dose: 1,000 mg     oxyCODONE immediate-release 5 MG Tabs  Commonly known as: Roxicodone   Take 1 Tablet by mouth every four hours as needed for Severe Pain for up to 3 days.  Dose: 5 mg     oxymetazoline 0.05 % Soln  Commonly known as: Afrin   Administer 2 Sprays into affected nostril(S) every 30 minutes as needed (Epistaxis).  Dose: 2 Spray     predniSONE 10 MG Tabs  Start taking on: May 17, 2025  Commonly known as: Deltasone   Doctor's comments:    Take 3 Tablets by mouth every day for 3 days, THEN 2 Tablets every day for 4 days, THEN 1 Tablet every day for 4 days.     sodium chloride 0.65 % Soln  Commonly known as: Ocean   Administer 2 Sprays into affected nostril(S) 3 times a day.  Dose: 2 Spray              Allergies  Allergies[1]    DIET  Orders Placed This Encounter   Procedures    Diet Order Diet: Regular     Standing Status:   Standing     Number of Occurrences:   1     Diet::   Regular [1]       ACTIVITY  As tolerated.  Weight bearing as tolerated    CONSULTATIONS  Otolaryngology  Neurosurgery    PROCEDURES  Immediate Post OP Note     PreOp Diagnosis:   Chronic sinusitis, Right frontal mucocele        PostOp Diagnosis: Chronic sinusitis, Right frontal mucocele        Procedure(s):  FESS (FUNCTIONAL ENDOSCOPIC SINUS SURGERY) STEALTH - Wound Class: Clean Contaminated     Surgeon(s):  Ofe Atkinson M.D.     Anesthesiologist/Type of Anesthesia:  Anesthesiologist: Ivonne Santillan M.D./General     Surgical Staff:  Circulator: Chip Tolentino R.N.  Scrub Person: Ghada Will     Specimens removed if any:  ID Type Source Tests Collected by Time Destination   1 :  Right Sinus Tissue Sinus AEROBIC/ANAEROBIC CULTURE (SURGERY) Ofe Atkinson M.D. 5/15/2025  3:09 PM     A : Left sinus contents Other Other PATHOLOGY SPECIMEN Ofe Atkinson M.D. 5/15/2025  2:45 PM     B : Right Sinus contents Other Other PATHOLOGY SPECIMEN Ofe Atkinson M.D. 5/15/2025  2:46 PM           Estimated Blood Loss: 100cc     Findings: see op note     Complications: none           5/15/2025 5:22 PM Ofe Atkinson M.D.             LABORATORY  Lab Results   Component Value Date    SODIUM 137 05/16/2025    POTASSIUM 4.1 05/16/2025    CHLORIDE 102 05/16/2025    CO2 24 05/16/2025    GLUCOSE 127 (H) 05/16/2025    BUN 15 05/16/2025    CREATININE 0.80 05/16/2025        Lab Results   Component Value Date    WBC 14.4 (H) 05/16/2025    HEMOGLOBIN 14.7 05/16/2025    HEMATOCRIT 45.7 05/16/2025    PLATELETCT 285 05/16/2025        Total time of the discharge process exceeds 34 minutes.         [1]   Allergies  Allergen Reactions    Amoxicillin      Generalized swelling

## 2025-05-16 NOTE — PROGRESS NOTES
"S: NAEON. Pain controlled - only having throat pain. Minimal nasal drainage. Some congestion.    O:  /62   Pulse 88   Temp 36.4 °C (97.5 °F) (Temporal)   Resp 18   Ht 1.803 m (5' 11\")   Wt (!) 129 kg (283 lb 15.2 oz)   SpO2 92%   Gen: interactive and appropriate  Pulm: Breathing comfortably on RA without stridor or stertor  Face: symmetric, no edema, facial strength intact bilaterally  Eyes: right eye no longer proptotic vs left. EOMI now almost completely full, particularly looking up, conjunctiva clear, no chemosis.   Nose: patent, with moist mucosa. no purulence or edema.     Neck: flat, no discrete masses      Recent Labs     05/13/25 2044 05/16/25  0433   WBC 10.0 14.4*   RBC 5.85 5.49   HEMOGLOBIN 16.0 14.7   HEMATOCRIT 47.5 45.7   MCV 81.2* 83.2   MCH 27.4 26.8*   MCHC 33.7 32.2*   RDW 40.7 41.4   PLATELETCT 274 285   MPV 10.7 10.6     Recent Labs     05/13/25 2044 05/16/25  0433   SODIUM 140 137   POTASSIUM 3.7 4.1   CHLORIDE 105 102   CO2 23 24   GLUCOSE 119* 127*   BUN 15 15   CREATININE 0.78 0.80   CALCIUM 9.2 8.8     Recent Labs     05/13/25 2044   APTT 27.9   INR 0.97         A/P:Gurjit Burgess is a 19 y.o. male with chronic sinusitis, large right frontal mucocele with mass effect on right orbit and right frontal lobe, and vision changes, now s/p endoscopic sinus surgery and marsupialization of mucocele. Doing very well. Vision and EOM much better. Eye no longer proptotic. No CSF leak at surgery, but did have bony erosion and very thin area along posterior table. Given this, recommend 2 weeks of avoiding heavy lifting (not more than 10-15 pounds) and no work for 2 weeks. Ok for saline spray at least TID (ok to do more, ordered). Recommend prednisone taper at discharge. 30mg daily for 4 days, 20mg daily for 4 days, 10 mg daily for 4 days. No nose blowing, sneeze with mouth open- discussed with patient.     Ofe Atkinson M.D.  [phone]  "

## 2025-05-16 NOTE — ANESTHESIA TIME REPORT
Anesthesia Start and Stop Event Times       Date Time Event    5/15/2025 1306 Ready for Procedure     1400 Anesthesia Start     1733 Anesthesia Stop          Responsible Staff  05/15/25      Name Role Begin End    Ivonne Santillan M.D. Anesth 1400 1733          Overtime Reason:  no overtime (within assigned shift)    Comments:

## 2025-05-16 NOTE — OR SURGEON
Immediate Post OP Note    PreOp Diagnosis:   Chronic sinusitis, Right frontal mucocele      PostOp Diagnosis: Chronic sinusitis, Right frontal mucocele      Procedure(s):  FESS (FUNCTIONAL ENDOSCOPIC SINUS SURGERY) STEALTH - Wound Class: Clean Contaminated    Surgeon(s):  Ofe Atkinson M.D.    Anesthesiologist/Type of Anesthesia:  Anesthesiologist: Ivonne Santillan M.D./General    Surgical Staff:  Circulator: Chip Tolentino R.N.  Scrub Person: Ghada Will    Specimens removed if any:  ID Type Source Tests Collected by Time Destination   1 : Right Sinus Tissue Sinus AEROBIC/ANAEROBIC CULTURE (SURGERY) Ofe Atkinson M.D. 5/15/2025  3:09 PM    A : Left sinus contents Other Other PATHOLOGY SPECIMEN Ofe Atkinson M.D. 5/15/2025  2:45 PM    B : Right Sinus contents Other Other PATHOLOGY SPECIMEN Ofe Atkinson M.D. 5/15/2025  2:46 PM        Estimated Blood Loss: 100cc    Findings: see op note    Complications: none        5/15/2025 5:22 PM Ofe Atkinson M.D.

## 2025-05-16 NOTE — CARE PLAN
The patient is Stable - Low risk of patient condition declining or worsening    Shift Goals  Clinical Goals: pt O2 saturation >90% and pain <6 by the end of my shift  Patient Goals: pain control  Family Goals: updates    Progress made toward(s) clinical / shift goals:  pt is A&Ox4 currently on 0.5 L oxymask, pt O2 saturation is 94%, pt has been medicated for pain as per MAR, currently pt is resting, eyes closed, even chest rise and fall, and non-labored breathing.      Patient is not progressing towards the following goals:

## 2025-05-16 NOTE — PROGRESS NOTES
4 Eyes Skin Assessment Completed by DIANA Toledo and DIANA Sosa.    Head WDL  Ears WDL  Nose WDL  Mouth dry lips  Neck WDL  Breast/Chest WDL  Shoulder Blades WDL  Spine WDL  (R) Arm/Elbow/Hand Scar  (L) Arm/Elbow/Hand WDL  Abdomen WDL  Groin discoloration  Scrotum/Coccyx/Buttocks Blanching and Discoloration  (R) Leg Scar on knee  (L) Leg WDL  (R) Heel/Foot/Toe Blanching  (L) Heel/Foot/Toe Blanching, one callous            Devices In Places Pulse Ox and SCD's, PIV, simple mask, wedges      Interventions In Place Pillows    Possible Skin Injury No    Pictures Uploaded Into Epic N/A  Wound Consult Placed N/A  RN Wound Prevention Protocol Ordered No

## 2025-05-16 NOTE — DISCHARGE INSTRUCTIONS
Discharge Instructions per Hu Langley M.D.    You were hospitalized for a benign cyst in your sinus (ethmoid mucocele) which required surgery to repair.    DIET: Regular    ACTIVITY: No lifting >10 lbs for 2 weeks    DIAGNOSIS: Ethmoid mucocele    Return to ER if you develop a fever (>100.4 F or >38 C), develop    Please purchase an under-the-tongue thermometer to measure your temperature if you think you have a fever.  Rinse your nose at least 3 times daily with saline spray.  If you have a nosebleed, douse your nostril with oxymetolazone (afrin) until it stops.

## 2025-05-16 NOTE — ANESTHESIA POSTPROCEDURE EVALUATION
Patient: Gurjit Burgess    Procedure Summary       Date: 05/15/25 Room / Location: [facility] 05 / SURGERY [facility]    Anesthesia Start: 1400 Anesthesia Stop: 1733    Procedure: FESS (FUNCTIONAL ENDOSCOPIC SINUS SURGERY) STEALTH (Bilateral: Nose) Diagnosis: (CHRONIC SINUSITIS, FRONTAL SINUS MUCOCELE)    Surgeons: Ofe Atkinson M.D. Responsible Provider: Ivonne Santillan M.D.    Anesthesia Type: general ASA Status: 2            Final Anesthesia Type: general  Last vitals  BP   Blood Pressure: 129/62    Temp   36.7 °C (98 °F)    Pulse   77   Resp   (!) 22    SpO2   98 %      Anesthesia Post Evaluation    Patient location during evaluation: PACU  Patient participation: complete - patient participated  Level of consciousness: awake and alert    Airway patency: patent  Anesthetic complications: no  Cardiovascular status: hemodynamically stable  Respiratory status: acceptable  Hydration status: euvolemic    PONV: none          No notable events documented.     Nurse Pain Score: 0 (NPRS)

## 2025-05-16 NOTE — OP REPORT
DATE OF SERVICE: 05/15/25     PREOPERATIVE DIAGNOSES:  1. Chronic maxillary sinusitis  2. Chronic ethmoid sinusitis  3. Chronic sphenoid sinusitis  4. Chronic frontal sinusitis  5. Nasal septal deviation  6. Right frontal mucocele     POSTOPERATIVE DIAGNOSES.  1. Chronic maxillary sinusitis  2. Chronic ethmoid sinusitis  3. Chronic sphenoid sinusitis  4. Chronic frontal sinusitis  5. Nasal septal deviation  6. Right frontal mucocele     PROCEDURES:  1.  Bilateral maxillary antrostomy.  2.  Bilateral sphenoethmoidectomy.  3. Bilateral frontal sinusotomy  4. Septoplasty   5. Utilization of image guidance, extradural.     SURGEON:  Ofe Atkinson MD     ANESTHESIA:  General.     ANESTHESIOLOGIST: Ivonne Santillan M.D.      ESTIMATED BLOOD LOSS:  100 mL.     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     SPECIMENS:  Right and left sinus contents to pathology.     INDICATIONS FOR PROCEDURE:  The patient is a 19-year-old male who presented to the hospital with significant double vision and right eye proptosis.  He was found to have significant chronic sinusitis and a large right frontal mucocele with extension into the orbit and posterior table of the frontal sinus. As such, the above stated procedures were offered and the patient desired strongly to proceed.  These were explained in detail.  Risks were discussed including but not limited to pain, bleeding, infection, scar formation, damage to the orbit or skull base, CSF leak, change in sense of smell, nasal septal perforation, continued inflammatory disease requiring medical therapy, recurrence of  symptoms and need for further procedures.  Informed surgical consent was obtained.      DESCRIPTION OF PROCEDURE:  The patient was met in the preoperative holding area and again the consent and history were reviewed.  He was brought back to the operating room in good condition.  After appropriate anesthetic markers were placed, a surgical time-out was taken and general endotracheal anesthesia was induced.  The bed was then turned to 180 degrees.  The nasal cavity was examined with the findings as noted above.  Lidocaine 1% with epinephrine was infiltrated into the septum for local anesthesia and topical epinephrine-soaked cottonoids were placed into the nose for decongestion.  The patient was then prepped and draped in the usual sterile fashion.  The image guidance navigation system was calibrated and noted to be functioning properly.  There was noted to be significant septal deviation to the left.  I proceeded with a septoplasty.  A 15 blade was used to make an incision overlying the large spur on the left-hand side.  Small flaps were elevated superiorly and inferiorly. A transcartilaginous incision was then made, preserving the contralateral flap.  The deviated portions of cartilage and bone were then removed to create a patent nasal airway.     I then proceeded with the sinus dissection first beginning on the right hand side.  The uncinate had been blown anteriorly and was protruding into the nasal airway.  There was polypoid degeneration of this area.  The middle turbinate was also significantly hypertrophied with polypoid degeneration.  I for started by debriding the abnormal uncinate and middle turbinate. The uncinate process was teased forward and removed sharply.   A 30-degree endoscope was utilized to ensure that the natural ostium of the maxillary sinus was incorporated into a large antrostomy and to ensure that the uncinate was completely removed.  There was a large Gissell cell that was partially  obstructing the natural ostium and this was removed.  There was significant edema in the sinus itself.  There was some scarring and mucous retention cyst formation.  This was all broken up and the sinus was copiously irrigated.  I was then able to visualize the base of the mucocele.  This seemed consistent with the ethmoid bulla.  I entered into this with quick rush of mucoid secretions.  A culture was taken of this.  I was then able to palpate the eye with further extrusion of significant mucoid material.  This opened into a very large cavity.  I then dissected around the opening of this to the border of the orbit laterally and the skull base posteriorly/superiorly.  The remainder of the ethmoids were then dissected. The basal lamella was traversed. Partitions were removed using a combination of cutting instruments and the microdebrider. The inferior third of the superior turbinate was then resected and the natural ostium of the sphenoid sinus was identified and enlarged. The skull base and orbit were identified in the sphenoid and posterior ethmoid cells and with the assistance of image guidance, dissection continued from posterior to anterior removing all ethmoid partitions. A 30 degree endoscope was utilized to dissect along the skull base anteriorly.  Given the large expansion of the mucocele, I was able to visualize along the anterior ethmoid skull base and into the large frontal.  This was all opened widely.  In the mucocele cavity I was able to see the bony dehiscence of the orbit and the posterior table of the frontal sinus.  Pulsations were visible from the posterior table dehiscence, but the mucosa overlying the dehiscent area was entirely intact.  There was no spinal fluid leak.  Similarly, the orbital contents were able to be visualized with pulsation of the orbit, but the mucosa was intact and there was no extrusion of fat.  This was gently irrigated.  A 70 degree endoscope was used to improve  visualization of the frontal sinus.  All bleeding was controlled using epinephrine soaked cottonoids and noted to be adequate.     I then proceeded to the right side and dissection was completed in the same fashion. The middle turbinate was medialized.  The uncinate was completely removed.  The 30-degree endoscope was utilized to ensure that the natural ostium was incorporated into the large antrostomy.  There was similarly large Gissell cell that was removed and was partially obstructing the sinus.  The anterior and posterior ethmoids were entered and dissected. The inferior third of the superior turbinate was removed and the natural ostium of the sphenoid sinus was opened widely. The skull base and orbit were identified posteriorly and, respecting these landmarks, all ethmoid partitions were removed working from posterior to anterior. The image guidance navigation was utilized to confirm landmarks, particularly along the skull base, the anterior ethmoid artery, and in the frontal sinus. The frontal sinus was then opened widely. The recess was noted to be significantly edematous.  Dissection was somewhat challenging in this location.  All bleeding was controlled using epinephrine soaked cottonoids and noted to be adequate.      The sinuses were then copiously irrigated.  NasoPore was placed into the sinus cavities bilaterally.  All bleeding was controlled using epinephrine-soaked cottonoids and was noted to be adequate at the conclusion of the case. The procedure was then terminated.  Care of the patient was turned back over to anesthesia for emergence and extubation.  He was taken to the PACU in stable condition.  All instrument counts were correct x2 at the completion of the case.        ____________________________________     Ofe Atkinson MD

## 2025-05-16 NOTE — PROGRESS NOTES
Received pt from PACU, pt is on simple mask at 2L NC,  in use pt O2 saturation is 96%, HR of 90. Patient is resting eyes, closed even chest rise and fall and unlabored. Mother at bedside. Family educated on the use of  and aware that the pt cannot blow nose, sneeze, or use a straw when drinking fluids. Patient has call light within reach, SCDs and seizure pads are in use.

## 2025-05-18 LAB
BACTERIA WND AEROBE CULT: ABNORMAL
BACTERIA WND AEROBE CULT: ABNORMAL
EKG IMPRESSION: NORMAL
GRAM STN SPEC: ABNORMAL
SIGNIFICANT IND 70042: ABNORMAL
SITE SITE: ABNORMAL
SOURCE SOURCE: ABNORMAL

## 2025-05-19 LAB
BACTERIA SPEC ANAEROBE CULT: NORMAL
SIGNIFICANT IND 70042: NORMAL
SITE SITE: NORMAL
SOURCE SOURCE: NORMAL

## (undated) DEVICE — GOWN WARMING STANDARD FLEX - (30/CA)

## (undated) DEVICE — TUBING PATIENT W/CONNECTOR REDEUCE (1EA)

## (undated) DEVICE — SWAB CULTURE AMIES ESWAB (50EA/PK)

## (undated) DEVICE — MASK OXYGEN VNYL ADLT MED CONC WITH 7 FOOT TUBING - (50EA/CA)

## (undated) DEVICE — LACTATED RINGERS INJ 1000 ML - (14EA/CA 60CA/PF)

## (undated) DEVICE — SUTURE 3-0 ETHILON FS-1 - (36/BX) 30 INCH

## (undated) DEVICE — SUCTION INSTRUMENT YANKAUER BULBOUS TIP W/O VENT (50EA/CA)

## (undated) DEVICE — CANISTER SUCTION RIGID RED 1500CC (40EA/CA)

## (undated) DEVICE — DRAPE U SPLIT IMP 54 X 76 - (24/CA)

## (undated) DEVICE — MASK ANESTHESIA ADULT  - (100/CA)

## (undated) DEVICE — PACK KNEE ARTHROSCOPY SM OR - (2EA/CA)

## (undated) DEVICE — KIT ANESTHESIA W/CIRCUIT & 3/LT BAG W/FILTER (20EA/CA)

## (undated) DEVICE — ANTI-FOG SOLUTION - 60BTL/CA

## (undated) DEVICE — BOVIE BLADE COATED &INSULATED - 25/PK

## (undated) DEVICE — ELECTRODE DUAL RETURN W/ CORD - (50/PK)

## (undated) DEVICE — ELECTRODE 850 FOAM ADHESIVE - HYDROGEL RADIOTRNSPRNT (50/PK)

## (undated) DEVICE — BLADE STRAIGHT SINUS (5EA/PK)

## (undated) DEVICE — SET LEADWIRE 5 LEAD BEDSIDE DISPOSABLE ECG (1SET OF 5/EA)

## (undated) DEVICE — GLOVE BIOGEL PI ULTRATOUCH SZ 7.5 SURGICAL PF LF -(50/BX 4BX/CA)

## (undated) DEVICE — SUTURE GENERAL

## (undated) DEVICE — BLADE SHAVER AGGRESSIVE PLUS 4.0MM ANGLED (5EA/BX)

## (undated) DEVICE — COVER LIGHT HANDLE ALC PLUS DISP (18EA/BX)

## (undated) DEVICE — PROTECTOR ULNA NERVE - (36PR/CA)

## (undated) DEVICE — GLOVE BIOGEL PI INDICATOR SZ 7.0 SURGICAL PF LF - (50/BX 4BX/CA)

## (undated) DEVICE — SUTURE PRELOADED #2 ULTRABRAID COBRAID (10EA/BX)

## (undated) DEVICE — KIT  I.V. START (100EA/CA)

## (undated) DEVICE — GOWN SURGICAL X-LARGE ULTRA - FILM-REINFORCED (20/CA)

## (undated) DEVICE — GLOVE BIOGEL SZ 7 SURGICAL PF LTX - (50PR/BX 4BX/CA)

## (undated) DEVICE — COAGULATOR SUCTION L3 MR OD8 FR FOOTSWITCH CABLE FLEXIBLE SHAFT STERILE DISPOSABLE (10EA/BX)

## (undated) DEVICE — GLOVE, LITE (PAIR)

## (undated) DEVICE — GLOVE BIOGEL SZ 6.5 SURGICAL PF LTX (50PR/BX 4BX/CA)

## (undated) DEVICE — GLOVE BIOGEL INDICATOR SZ 7.5 SURGICAL PF LTX - (50PR/BX 4BX/CA)

## (undated) DEVICE — CHLORAPREP 26 ML APPLICATOR - ORANGE TINT(25/CA)

## (undated) DEVICE — SODIUM CHL IRRIGATION 0.9% 1000ML (12EA/CA)

## (undated) DEVICE — GLOVE SZ 7.5 LF PROTEXIS (50PR/BX)

## (undated) DEVICE — NEPTUNE 4 PORT MANIFOLD - (20/PK)

## (undated) DEVICE — SLEEVE VASO DVT COMPRESSION CALF MED - (10PR/CA)

## (undated) DEVICE — HUMID-VENT HEAT AND MOISTURE EXCHANGE- (50/BX)

## (undated) DEVICE — DRESSING NON-ADHERING 8 X 3 - (50/BX)

## (undated) DEVICE — TUBE CONNECTING SUCTION - CLEAR PLASTIC STERILE 72 IN (50EA/CA)

## (undated) DEVICE — GOWN SURGEONS LARGE - (32/CA)

## (undated) DEVICE — SENSOR SPO2 NEO LNCS ADHESIVE (20/BX) SEE USER NOTES

## (undated) DEVICE — SUTURE 1 VICRYL PLUS CT-1 - 18 INCH (12/BX)

## (undated) DEVICE — TRAY SRGPRP PVP IOD WT PRP - (20/CA)

## (undated) DEVICE — DRESSING TRANSPARENT FILM TEGADERM 2.375 X 2.75" (100EA/BX)"

## (undated) DEVICE — TUBING PUMP WITH CONNECTOR REDEUCE (1EA)

## (undated) DEVICE — PACK NEURO - (3EA/CA)

## (undated) DEVICE — HEAD HOLDER JUNIOR/ADULT

## (undated) DEVICE — SENSOR OXIMETER ADULT SPO2 RD SET (20EA/BX)

## (undated) DEVICE — TUBE CONNECT SUCTION CLEAR 120 X 1/4" (50EA/CA)"

## (undated) DEVICE — SODIUM CHL. IRRIGATION 0.9% 3000ML (4EA/CA 65CA/PF)

## (undated) DEVICE — DRAPE LOWER EXTREMETY - (6/CA)

## (undated) DEVICE — BAG SPONGE COUNT 10.25 X 32 - BLUE (250/CA)

## (undated) DEVICE — CANISTER SUCTION 3000ML MECHANICAL FILTER AUTO SHUTOFF MEDI-VAC NONSTERILE LF DISP (40EA/CA)

## (undated) DEVICE — SET EXTENSION WITH 2 PORTS (48EA/CA) ***PART #2C8610 IS A SUBSTITUTE*****

## (undated) DEVICE — SYRINGE 50 ML LL (40EA/BX 4BX/CA)

## (undated) DEVICE — DRAPE LARGE 3 QUARTER - (20/CA)

## (undated) DEVICE — SOD. CHL. INJ. 0.9% 250 ML - (36/CA 50CA/PF)

## (undated) DEVICE — GLOVE BIOGEL PI INDICATOR SZ 6.5 SURGICAL PF LF - (50/BX 4BX/CA)

## (undated) DEVICE — DRAPE SURGICAL U 77X120 - (10/CA)

## (undated) DEVICE — TOWELS CLOTH SURGICAL - (4/PK 20PK/CA)

## (undated) DEVICE — KIT SURGIFLO W/OUT THROMBIN - (6EA/BX)

## (undated) DEVICE — NEEDLE W/FACET TIP DULL VERSION W/STIMULATION CABLE SONOPLEX 21G X 4 (10/EA)"

## (undated) DEVICE — PATTIES SURG X-RAYCOTTONOID - 1/2 X 3 IN (200/CA)

## (undated) DEVICE — TUBING ENDOSCRUB II (5EA/PK)

## (undated) DEVICE — SHEATH SHARP SITE 30 DEGREE ENDOSCRUB II (5EA/PK)

## (undated) DEVICE — GLOVE BIOGEL INDICATOR SZ 8 SURGICAL PF LTX - (50/BX 4BX/CA)

## (undated) DEVICE — CANNULA O2 COMFORT SOFT EAR ADULT 7 FT TUBING (50/CA)

## (undated) DEVICE — TUBING C&T SET FLYING LEADS DRAIN TUBING (10EA/BX)

## (undated) DEVICE — TUBING CLEARLINK DUO-VENT - C-FLO (48EA/CA)

## (undated) DEVICE — SYRINGE 30 ML LL (56/BX)

## (undated) DEVICE — WATER IRRIGATION STERILE 1000ML (12EA/CA)

## (undated) DEVICE — NEEDLE SAFETY 18 GA X 1 1/2 IN (100EA/BX)